# Patient Record
Sex: FEMALE | Race: WHITE | URBAN - NONMETROPOLITAN AREA
[De-identification: names, ages, dates, MRNs, and addresses within clinical notes are randomized per-mention and may not be internally consistent; named-entity substitution may affect disease eponyms.]

---

## 2018-10-31 LAB
AVERAGE GLUCOSE: NORMAL
CHOLESTEROL, TOTAL: 184 MG/DL
CHOLESTEROL/HDL RATIO: 3.5
GLUCOSE BLD-MCNC: 100 MG/DL
HBA1C MFR BLD: 5.6 %
HDLC SERPL-MCNC: 53 MG/DL (ref 35–70)
LDL CHOLESTEROL CALCULATED: 109 MG/DL (ref 0–160)
TRIGL SERPL-MCNC: 111 MG/DL
VLDLC SERPL CALC-MCNC: NORMAL MG/DL

## 2019-05-30 ENCOUNTER — OFFICE VISIT (OUTPATIENT)
Dept: FAMILY MEDICINE CLINIC | Age: 59
End: 2019-05-30
Payer: COMMERCIAL

## 2019-05-30 VITALS
DIASTOLIC BLOOD PRESSURE: 72 MMHG | SYSTOLIC BLOOD PRESSURE: 124 MMHG | WEIGHT: 134 LBS | TEMPERATURE: 97.7 F | OXYGEN SATURATION: 98 % | HEART RATE: 64 BPM | BODY MASS INDEX: 21.53 KG/M2 | HEIGHT: 66 IN
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
## 2019-05-30 PROCEDURE — 99213 OFFICE O/P EST LOW 20 MIN: CPT | Performed by: INTERNAL MEDICINE

## 2019-05-30 RX ORDER — DOXYCYCLINE HYCLATE 100 MG
100 TABLET ORAL 2 TIMES DAILY
Qty: 14 TABLET | Refills: 0 | Status: SHIPPED | OUTPATIENT
Start: 2019-05-30 | End: 2019-06-06

## 2019-05-30 ASSESSMENT — PATIENT HEALTH QUESTIONNAIRE - PHQ9
SUM OF ALL RESPONSES TO PHQ QUESTIONS 1-9: 0
SUM OF ALL RESPONSES TO PHQ9 QUESTIONS 1 & 2: 0
SUM OF ALL RESPONSES TO PHQ QUESTIONS 1-9: 0
2. FEELING DOWN, DEPRESSED OR HOPELESS: 0
1. LITTLE INTEREST OR PLEASURE IN DOING THINGS: 0

## 2019-05-30 NOTE — PROGRESS NOTES
2019   [facility]    Shahram García (:  1960) is a 61 y.o. female presents to express care after being bit by a tick on her left anterior arm. She's tried numerous home remedies including drawing salves which doesn't seem to help. Review of Systems    Prior to Visit Medications    Medication Sig Taking? Authorizing Provider   doxycycline hyclate (VIBRA-TABS) 100 MG tablet Take 1 tablet by mouth 2 times daily for 7 days Yes Kenda Lombard, DO        Social History     Tobacco Use    Smoking status: Never Smoker    Smokeless tobacco: Never Used   Substance Use Topics    Alcohol use: Not on file        Vitals:    19 1303   BP: 124/72   Pulse: 64   Temp: 97.7 °F (36.5 °C)   SpO2: 98%   Weight: 134 lb (60.8 kg)   Height: 5' 6\" (1.676 m)     Estimated body mass index is 21.63 kg/m² as calculated from the following:    Height as of this encounter: 5' 6\" (1.676 m). Weight as of this encounter: 134 lb (60.8 kg). Physical Exam   Exam of the skin on the left arm shows a 2.5 cm injected macular area with centralized scab wound. She remembers seeing the tick embedded in the arm before she tried to dig it out. There were a few small vesicles around the wound but this is likely from the high concentration/high osmolality drawing salve that she used. ASSESSMENT/PLAN:    ICD-10-CM    1. Tick bite, initial encounter W57. XXXA       She needs to use doxycycline 100 mg twice a day for 1 week. This hasn't also a cellulitis now involved. She agrees that she does not want to risk Lyme's disease. No follow-ups on file. An electronic signature was used to authenticate this note.     --Kenda Lombard, DO on 2019 at 1:20 PM

## 2019-08-19 ENCOUNTER — OFFICE VISIT (OUTPATIENT)
Dept: FAMILY MEDICINE CLINIC | Age: 59
End: 2019-08-19
Payer: COMMERCIAL

## 2019-08-19 VITALS
HEIGHT: 66 IN | TEMPERATURE: 98.3 F | BODY MASS INDEX: 22.2 KG/M2 | WEIGHT: 138.13 LBS | SYSTOLIC BLOOD PRESSURE: 112 MMHG | OXYGEN SATURATION: 99 % | HEART RATE: 88 BPM | DIASTOLIC BLOOD PRESSURE: 78 MMHG

## 2019-08-19 DIAGNOSIS — S66.911A SPRAIN AND STRAIN OF RIGHT WRIST: ICD-10-CM

## 2019-08-19 DIAGNOSIS — M79.641 RIGHT HAND PAIN: Primary | ICD-10-CM

## 2019-08-19 DIAGNOSIS — S63.501A SPRAIN AND STRAIN OF RIGHT WRIST: ICD-10-CM

## 2019-08-19 DIAGNOSIS — M25.531 RIGHT WRIST PAIN: ICD-10-CM

## 2019-08-19 PROCEDURE — 99213 OFFICE O/P EST LOW 20 MIN: CPT | Performed by: FAMILY MEDICINE

## 2019-08-19 ASSESSMENT — ENCOUNTER SYMPTOMS
ABDOMINAL PAIN: 0
DIARRHEA: 0
COUGH: 0
CHEST TIGHTNESS: 0
NAUSEA: 0
VOMITING: 0
SINUS PAIN: 0
SHORTNESS OF BREATH: 0
WHEEZING: 0
BACK PAIN: 0
EYE PAIN: 0
TROUBLE SWALLOWING: 0
CONSTIPATION: 0
SORE THROAT: 0

## 2019-10-17 LAB
ALBUMIN SERPL-MCNC: 4.9 G/DL
ALP BLD-CCNC: 72 U/L
ALT SERPL-CCNC: 24 U/L
ANION GAP SERPL CALCULATED.3IONS-SCNC: NORMAL MMOL/L
AST SERPL-CCNC: 21 U/L
BASOPHILS ABSOLUTE: 0 /ΜL
BASOPHILS RELATIVE PERCENT: 0 %
BILIRUB SERPL-MCNC: 0.4 MG/DL (ref 0.1–1.4)
BUN BLDV-MCNC: 9 MG/DL
CALCIUM SERPL-MCNC: 9.8 MG/DL
CHLORIDE BLD-SCNC: 102 MMOL/L
CHOLESTEROL, TOTAL: 168 MG/DL
CHOLESTEROL/HDL RATIO: 3.1
CO2: NORMAL
CREAT SERPL-MCNC: 0.92 MG/DL
EOSINOPHILS ABSOLUTE: 0.3 /ΜL
EOSINOPHILS RELATIVE PERCENT: 6 %
GFR CALCULATED: 68
GLUCOSE BLD-MCNC: NORMAL MG/DL
HCT VFR BLD CALC: 39.5 % (ref 36–46)
HDLC SERPL-MCNC: 54 MG/DL (ref 35–70)
HEMOGLOBIN: 13.3 G/DL (ref 12–16)
LDL CHOLESTEROL CALCULATED: 92 MG/DL (ref 0–160)
LYMPHOCYTES ABSOLUTE: 1.5 /ΜL
LYMPHOCYTES RELATIVE PERCENT: 34 %
MCH RBC QN AUTO: 30.4 PG
MCHC RBC AUTO-ENTMCNC: 33.7 G/DL
MCV RBC AUTO: 90 FL
MONOCYTES ABSOLUTE: 0.5 /ΜL
MONOCYTES RELATIVE PERCENT: 10 %
NEUTROPHILS ABSOLUTE: 2.2 /ΜL
NEUTROPHILS RELATIVE PERCENT: 50 %
PDW BLD-RTO: 12.8 %
PLATELET # BLD: 307 K/ΜL
PMV BLD AUTO: NORMAL FL
POTASSIUM SERPL-SCNC: 5.6 MMOL/L
RBC # BLD: 4.37 10^6/ΜL
SODIUM BLD-SCNC: 140 MMOL/L
T3 TOTAL: 27
T4 FREE: 8.1
TOTAL PROTEIN: 7.4
TRIGL SERPL-MCNC: 111 MG/DL
TSH SERPL DL<=0.05 MIU/L-ACNC: 2.59 UIU/ML
VLDLC SERPL CALC-MCNC: 22 MG/DL
WBC # BLD: 4.5 10^3/ML

## 2019-10-31 LAB
ALBUMIN SERPL-MCNC: 4.7 G/DL
ALP BLD-CCNC: 66 U/L
ALT SERPL-CCNC: 24 U/L
ANION GAP SERPL CALCULATED.3IONS-SCNC: NORMAL MMOL/L
AST SERPL-CCNC: 25 U/L
BASOPHILS ABSOLUTE: 0.1 /ΜL
BASOPHILS RELATIVE PERCENT: 1 %
BILIRUB SERPL-MCNC: 0.4 MG/DL (ref 0.1–1.4)
BUN BLDV-MCNC: NORMAL MG/DL
CALCIUM SERPL-MCNC: 9.5 MG/DL
CHLORIDE BLD-SCNC: NORMAL MMOL/L
CHOLESTEROL, TOTAL: 184 MG/DL
CHOLESTEROL/HDL RATIO: 3.5
CO2: NORMAL
CREAT SERPL-MCNC: 0.96 MG/DL
EOSINOPHILS ABSOLUTE: 0.2 /ΜL
EOSINOPHILS RELATIVE PERCENT: 6 %
GFR CALCULATED: 65
GLUCOSE BLD-MCNC: 100 MG/DL
HCT VFR BLD CALC: 39.7 % (ref 36–46)
HDLC SERPL-MCNC: 53 MG/DL (ref 35–70)
HEMOGLOBIN: 13.2 G/DL (ref 12–16)
LDL CHOLESTEROL CALCULATED: 109 MG/DL (ref 0–160)
LYMPHOCYTES ABSOLUTE: 1.3 /ΜL
LYMPHOCYTES RELATIVE PERCENT: 36 %
MCH RBC QN AUTO: 30.3 PG
MCHC RBC AUTO-ENTMCNC: 33.2 G/DL
MCV RBC AUTO: 91 FL
MONOCYTES ABSOLUTE: 0.4 /ΜL
MONOCYTES RELATIVE PERCENT: 11 %
NEUTROPHILS ABSOLUTE: 1.7 /ΜL
NEUTROPHILS RELATIVE PERCENT: 46 %
PDW BLD-RTO: 13 %
PLATELET # BLD: 289 K/ΜL
PMV BLD AUTO: NORMAL FL
POTASSIUM SERPL-SCNC: 4.9 MMOL/L
RBC # BLD: 4.35 10^6/ΜL
SODIUM BLD-SCNC: NORMAL MMOL/L
T3 TOTAL: 25
T4 FREE: 7.7
TOTAL PROTEIN: 7
TRIGL SERPL-MCNC: 111 MG/DL
TSH SERPL DL<=0.05 MIU/L-ACNC: 3.84 UIU/ML
VLDLC SERPL CALC-MCNC: 22 MG/DL
WBC # BLD: 3.7 10^3/ML

## 2019-11-19 ENCOUNTER — TELEPHONE (OUTPATIENT)
Dept: FAMILY MEDICINE CLINIC | Age: 59
End: 2019-11-19

## 2019-12-03 ENCOUNTER — OFFICE VISIT (OUTPATIENT)
Dept: FAMILY MEDICINE CLINIC | Age: 59
End: 2019-12-03
Payer: COMMERCIAL

## 2019-12-03 VITALS
HEIGHT: 67 IN | SYSTOLIC BLOOD PRESSURE: 126 MMHG | WEIGHT: 138 LBS | BODY MASS INDEX: 21.66 KG/M2 | HEART RATE: 74 BPM | DIASTOLIC BLOOD PRESSURE: 74 MMHG | OXYGEN SATURATION: 96 %

## 2019-12-03 DIAGNOSIS — M19.90 INFLAMMATORY ARTHRITIS: Primary | ICD-10-CM

## 2019-12-03 PROCEDURE — 90471 IMMUNIZATION ADMIN: CPT | Performed by: INTERNAL MEDICINE

## 2019-12-03 PROCEDURE — 99214 OFFICE O/P EST MOD 30 MIN: CPT | Performed by: INTERNAL MEDICINE

## 2019-12-03 PROCEDURE — 90686 IIV4 VACC NO PRSV 0.5 ML IM: CPT | Performed by: INTERNAL MEDICINE

## 2019-12-04 SDOH — HEALTH STABILITY: MENTAL HEALTH
STRESS IS WHEN SOMEONE FEELS TENSE, NERVOUS, ANXIOUS, OR CAN'T SLEEP AT NIGHT BECAUSE THEIR MIND IS TROUBLED. HOW STRESSED ARE YOU?: TO SOME EXTENT

## 2019-12-04 SDOH — HEALTH STABILITY: MENTAL HEALTH: HOW MANY STANDARD DRINKS CONTAINING ALCOHOL DO YOU HAVE ON A TYPICAL DAY?: 1 OR 2

## 2019-12-04 SDOH — HEALTH STABILITY: PHYSICAL HEALTH: ON AVERAGE, HOW MANY MINUTES DO YOU ENGAGE IN EXERCISE AT THIS LEVEL?: 0 MIN

## 2019-12-04 SDOH — HEALTH STABILITY: MENTAL HEALTH: HOW OFTEN DO YOU HAVE A DRINK CONTAINING ALCOHOL?: 2-4 TIMES A MONTH

## 2019-12-04 SDOH — HEALTH STABILITY: PHYSICAL HEALTH: ON AVERAGE, HOW MANY DAYS PER WEEK DO YOU ENGAGE IN MODERATE TO STRENUOUS EXERCISE (LIKE A BRISK WALK)?: 0 DAYS

## 2019-12-04 ASSESSMENT — ENCOUNTER SYMPTOMS
RESPIRATORY NEGATIVE: 1
EYES NEGATIVE: 1
GASTROINTESTINAL NEGATIVE: 1
ALLERGIC/IMMUNOLOGIC NEGATIVE: 1

## 2019-12-05 LAB — SEDIMENTATION RATE, ERYTHROCYTE: 9

## 2019-12-09 ENCOUNTER — TELEPHONE (OUTPATIENT)
Dept: FAMILY MEDICINE CLINIC | Age: 59
End: 2019-12-09

## 2020-01-06 ENCOUNTER — TELEPHONE (OUTPATIENT)
Dept: FAMILY MEDICINE CLINIC | Age: 60
End: 2020-01-06

## 2020-01-06 NOTE — TELEPHONE ENCOUNTER
I have not heard anything from Dr. Lisa Gauthier. Please get the most recent documentation from that office. I believe it is Zuleika Tena that she is referring to.

## 2020-01-28 ENCOUNTER — HOSPITAL ENCOUNTER (OUTPATIENT)
Age: 60
Discharge: HOME | End: 2020-01-30
Payer: COMMERCIAL

## 2020-01-28 ENCOUNTER — OFFICE VISIT (OUTPATIENT)
Dept: FAMILY MEDICINE CLINIC | Age: 60
End: 2020-01-28
Payer: COMMERCIAL

## 2020-01-28 VITALS — SYSTOLIC BLOOD PRESSURE: 132 MMHG | DIASTOLIC BLOOD PRESSURE: 80 MMHG | OXYGEN SATURATION: 99 % | HEART RATE: 62 BPM

## 2020-01-28 PROBLEM — R30.0 DYSURIA: Status: ACTIVE | Noted: 2020-01-28

## 2020-01-28 PROBLEM — K21.9 GASTROESOPHAGEAL REFLUX DISEASE WITHOUT ESOPHAGITIS: Status: ACTIVE | Noted: 2020-01-28

## 2020-01-28 PROBLEM — M19.90 INFLAMMATORY ARTHRITIS: Status: ACTIVE | Noted: 2020-01-28

## 2020-01-28 LAB
BILIRUBIN, POC: NORMAL
BLOOD URINE, POC: NORMAL
CLARITY, POC: CLEAR
COLOR, POC: YELLOW
GLUCOSE URINE, POC: NORMAL
KETONES, POC: NORMAL
LEUKOCYTE EST, POC: NORMAL
NITRITE, POC: NORMAL
PH, POC: 6.5
PROTEIN, POC: NORMAL
SPECIFIC GRAVITY, POC: 1.02
UROBILINOGEN, POC: 0.2

## 2020-01-28 PROCEDURE — 99213 OFFICE O/P EST LOW 20 MIN: CPT | Performed by: INTERNAL MEDICINE

## 2020-01-28 PROCEDURE — 87088 URINE BACTERIA CULTURE: CPT

## 2020-01-28 PROCEDURE — 81002 URINALYSIS NONAUTO W/O SCOPE: CPT | Performed by: INTERNAL MEDICINE

## 2020-01-28 RX ORDER — OMEPRAZOLE 20 MG/1
20 CAPSULE, DELAYED RELEASE ORAL
Qty: 90 CAPSULE | Refills: 1 | Status: SHIPPED | OUTPATIENT
Start: 2020-01-28

## 2020-01-28 ASSESSMENT — ENCOUNTER SYMPTOMS
ALLERGIC/IMMUNOLOGIC NEGATIVE: 1
EYES NEGATIVE: 1
RESPIRATORY NEGATIVE: 1

## 2020-01-28 ASSESSMENT — PATIENT HEALTH QUESTIONNAIRE - PHQ9
SUM OF ALL RESPONSES TO PHQ QUESTIONS 1-9: 0
1. LITTLE INTEREST OR PLEASURE IN DOING THINGS: 0
SUM OF ALL RESPONSES TO PHQ QUESTIONS 1-9: 0
SUM OF ALL RESPONSES TO PHQ9 QUESTIONS 1 & 2: 0
2. FEELING DOWN, DEPRESSED OR HOPELESS: 0

## 2020-01-29 ENCOUNTER — TELEPHONE (OUTPATIENT)
Dept: FAMILY MEDICINE CLINIC | Age: 60
End: 2020-01-29

## 2020-01-30 NOTE — TELEPHONE ENCOUNTER
Armando Smith calling she got the faxed letter, however we put that she is having the ct scan. Her  apryl is.  I will update letter and fax again

## 2020-01-31 LAB — URINE CULTURE, ROUTINE: NORMAL

## 2020-03-03 ENCOUNTER — TELEPHONE (OUTPATIENT)
Dept: FAMILY MEDICINE CLINIC | Age: 60
End: 2020-03-03

## 2020-03-03 NOTE — TELEPHONE ENCOUNTER
I just glance that this report. Please have it scanned in and then I will make further comment. These are often over called by radiology.

## 2020-03-03 NOTE — TELEPHONE ENCOUNTER
Patient informed. She states 's office told her the x-ray showed she had hyperinflammation in her lungs. Patient states she is now confused.    Please advise, thank you

## 2020-03-06 ENCOUNTER — TELEPHONE (OUTPATIENT)
Dept: FAMILY MEDICINE CLINIC | Age: 60
End: 2020-03-06

## 2020-03-13 LAB
EXPIRATORY TIME: NORMAL
FEF 25-75% %PRED-PRE: NORMAL
FEF 25-75% PRED: NORMAL
FEF 25-75%-PRE: NORMAL
FEV1 %PRED-PRE: 117 %
FEV1 PRED: NORMAL
FEV1/FVC %PRED-PRE: NORMAL
FEV1/FVC PRED: NORMAL
FEV1/FVC: 103 %
FEV1: NORMAL
FVC %PRED-PRE: NORMAL
FVC PRED: NORMAL
FVC: NORMAL
PEF %PRED-PRE: NORMAL
PEF PRED: NORMAL
PEF-PRE: NORMAL

## 2020-03-13 ASSESSMENT — PULMONARY FUNCTION TESTS
FEV1_PERCENT_PREDICTED_PRE: 117
FEV1/FVC: 103

## 2020-03-16 ENCOUNTER — TELEPHONE (OUTPATIENT)
Dept: FAMILY MEDICINE CLINIC | Age: 60
End: 2020-03-16

## 2020-03-16 NOTE — TELEPHONE ENCOUNTER
Canceled the March 18 appointment. When I get the pulmonary function tests from [facility], I will call her.

## 2020-03-16 NOTE — TELEPHONE ENCOUNTER
Master Ureña is calling for her PFT results from 03/13? She is wanting them over the phone and to see if dr will cx her 03/18 visit. We do not have report. I called [facility] and and it is not yet read. They will keep checking and send over once finalized. Can she cx 03/18 appt and come as scheduled in June ?

## 2021-04-12 ENCOUNTER — OFFICE VISIT (OUTPATIENT)
Dept: FAMILY MEDICINE CLINIC | Age: 61
End: 2021-04-12
Payer: COMMERCIAL

## 2021-04-12 VITALS — OXYGEN SATURATION: 98 % | WEIGHT: 143.6 LBS | BODY MASS INDEX: 22.83 KG/M2 | HEART RATE: 77 BPM | TEMPERATURE: 96.9 F

## 2021-04-12 DIAGNOSIS — R30.0 DYSURIA: ICD-10-CM

## 2021-04-12 DIAGNOSIS — R30.0 DYSURIA: Primary | ICD-10-CM

## 2021-04-12 LAB
BILIRUBIN, POC: ABNORMAL
BLOOD URINE, POC: NEGATIVE
CLARITY, POC: CLEAR
COLOR, POC: ABNORMAL
GLUCOSE URINE, POC: 100
KETONES, POC: NEGATIVE
LEUKOCYTE EST, POC: ABNORMAL
NITRITE, POC: POSITIVE
PH, POC: 6
PROTEIN, POC: 100
SPECIFIC GRAVITY, POC: >=1.03
UROBILINOGEN, POC: 4

## 2021-04-12 PROCEDURE — 81002 URINALYSIS NONAUTO W/O SCOPE: CPT | Performed by: FAMILY MEDICINE

## 2021-04-12 PROCEDURE — 99213 OFFICE O/P EST LOW 20 MIN: CPT | Performed by: FAMILY MEDICINE

## 2021-04-12 RX ORDER — SULFAMETHOXAZOLE AND TRIMETHOPRIM 800; 160 MG/1; MG/1
1 TABLET ORAL 2 TIMES DAILY
Qty: 14 TABLET | Refills: 0 | Status: SHIPPED | OUTPATIENT
Start: 2021-04-12 | End: 2021-04-19

## 2021-04-12 NOTE — PROGRESS NOTES
OFFICE NOTE    21  Name: Chelle Stroud  NW   Sex:female   Age:61 y.o. SUBJECTIVE  Chief Complaint   Patient presents with    Urinary Tract Infection     Onset Saturday       HPI Got Covid shot on Friday. Fever and vomiting on Saturday. Then burning with urination, frequency,urgency. Review of Systems   No fever or vomiting yesterday or today, no gross hematuria        Current Outpatient Medications:     omeprazole (PRILOSEC) 20 MG delayed release capsule, Take 1 capsule by mouth every morning (before breakfast), Disp: 90 capsule, Rfl: 1  No Known Allergies    Past Medical History:   Diagnosis Date    Cervical cancer ([facility] [address] 2018    [facility] Inflammatory arthritis     [facility]- [facility]     Past Surgical History:   Procedure Laterality Date    BREAST CYST EXCISION Left 2009    CHOLECYSTECTOMY, LAPAROSCOPIC N/A     [place]    SALIVARY GLAND SURGERY      [address]     Family History   Problem Relation Age of Onset    Other Mother         scleroderma    Other Father         valvular heart disease-endocarditis    Coronary Art Dis Brother         MI age 43- valve replacement-ICD     Social History     Tobacco History     Smoking Status  Never Smoker    Smokeless Tobacco Use  Never Used          Alcohol History     Alcohol Use Status  Yes Drinks/Week  1 Glasses of wine per week Amount  1.0 standard drinks of alcohol/wk          Drug Use     Drug Use Status  Never          Sexual Activity     Sexually Active  Not Asked                OBJECTIVE  Vitals:    21 1645   Pulse: 77   Temp: 96.9 °F (36.1 °C)   SpO2: 98%   Weight: 143 lb 9.6 oz (65.1 kg)        Body mass index is 22.83 kg/m². Orders Placed This Encounter   Procedures    Culture, Urine     Standing Status:   Future     Standing Expiration Date:   2022     Order Specific Question:   Specify (ex-cath, midstream, cysto, etc)?      Answer:   midstream    POCT Urinalysis no Micro        EXAM   Physical

## 2021-04-15 LAB
ORGANISM: ABNORMAL
URINE CULTURE, ROUTINE: ABNORMAL

## 2021-10-26 ENCOUNTER — OFFICE VISIT (OUTPATIENT)
Dept: FAMILY MEDICINE CLINIC | Age: 61
End: 2021-10-26
Payer: COMMERCIAL

## 2021-10-26 VITALS
TEMPERATURE: 97.6 F | HEART RATE: 77 BPM | BODY MASS INDEX: 22.95 KG/M2 | WEIGHT: 146.2 LBS | RESPIRATION RATE: 18 BRPM | DIASTOLIC BLOOD PRESSURE: 78 MMHG | HEIGHT: 67 IN | OXYGEN SATURATION: 98 % | SYSTOLIC BLOOD PRESSURE: 124 MMHG

## 2021-10-26 DIAGNOSIS — J01.00 ACUTE NON-RECURRENT MAXILLARY SINUSITIS: Primary | ICD-10-CM

## 2021-10-26 PROCEDURE — 99213 OFFICE O/P EST LOW 20 MIN: CPT | Performed by: NURSE PRACTITIONER

## 2021-10-26 RX ORDER — CETIRIZINE HYDROCHLORIDE 10 MG/1
10 TABLET ORAL DAILY
Qty: 30 TABLET | Refills: 0 | Status: SHIPPED | OUTPATIENT
Start: 2021-10-26

## 2021-10-26 RX ORDER — AMOXICILLIN 875 MG/1
875 TABLET, COATED ORAL 2 TIMES DAILY
Qty: 20 TABLET | Refills: 0 | Status: SHIPPED | OUTPATIENT
Start: 2021-10-26 | End: 2021-11-05

## 2021-10-26 RX ORDER — FLUTICASONE PROPIONATE 50 MCG
SPRAY, SUSPENSION (ML) NASAL
Qty: 16 G | Refills: 0 | Status: SHIPPED | OUTPATIENT
Start: 2021-10-26

## 2021-10-26 NOTE — PATIENT INSTRUCTIONS
Patient Education        Sinusitis: Care Instructions  Your Care Instructions     Sinusitis is an infection of the lining of the sinus cavities in your head. Sinusitis often follows a cold. It causes pain and pressure in your head and face. In most cases, sinusitis gets better on its own in 1 to 2 weeks. But some mild symptoms may last for several weeks. Sometimes antibiotics are needed. Follow-up care is a key part of your treatment and safety. Be sure to make and go to all appointments, and call your doctor if you are having problems. It's also a good idea to know your test results and keep a list of the medicines you take. How can you care for yourself at home? · Take an over-the-counter pain medicine, such as acetaminophen (Tylenol), ibuprofen (Advil, Motrin), or naproxen (Aleve). Read and follow all instructions on the label. · If the doctor prescribed antibiotics, take them as directed. Do not stop taking them just because you feel better. You need to take the full course of antibiotics. · Be careful when taking over-the-counter cold or flu medicines and Tylenol at the same time. Many of these medicines have acetaminophen, which is Tylenol. Read the labels to make sure that you are not taking more than the recommended dose. Too much acetaminophen (Tylenol) can be harmful. · Breathe warm, moist air from a steamy shower, a hot bath, or a sink filled with hot water. Avoid cold, dry air. Using a humidifier in your home may help. Follow the directions for cleaning the machine. · Use saline (saltwater) nasal washes. This can help keep your nasal passages open and wash out mucus and bacteria. You can buy saline nose drops at a grocery store or drugstore. Or you can make your own at home by adding 1 teaspoon of salt and 1 teaspoon of baking soda to 2 cups of distilled water. If you make your own, fill a bulb syringe with the solution, insert the tip into your nostril, and squeeze gently.  Misha Flower your nose.  · Put a hot, wet towel or a warm gel pack on your face 3 or 4 times a day for 5 to 10 minutes each time. · Try a decongestant nasal spray like oxymetazoline (Afrin). Do not use it for more than 3 days in a row. Using it for more than 3 days can make your congestion worse. When should you call for help? Call your doctor now or seek immediate medical care if:    · You have new or worse swelling or redness in your face or around your eyes.     · You have a new or higher fever. Watch closely for changes in your health, and be sure to contact your doctor if:    · You have new or worse facial pain.     · The mucus from your nose becomes thicker (like pus) or has new blood in it.     · You are not getting better as expected. Where can you learn more? Go to https://[employer]. org and sign in to your [email or web address] account. Enter D235 in the Plexxi box to learn more about \"Sinusitis: Care Instructions. \"     If you do not have an account, please click on the \"Sign Up Now\" link. Current as of: December 2, 2020               Content Version: 13.0  © 6400-0193 Healthwise, [facility]. Care instructions adapted under license by [facility] ([facility]). If you have questions about a medical condition or this instruction, always ask your healthcare professional. [address] any warranty or liability for your use of this information.

## 2021-10-26 NOTE — PROGRESS NOTES
10/26/21  Raul Bo : 1960 Sex: female  Age 64 y.o. Subjective:  Chief Complaint   Patient presents with    Drainage     patient states that she has so much drainage draining down the back of her throat her throat is swollen. Mack Pineda Laryngitis    Sinusitis     patient states that she has ear pressure, eye pressure, head pressure very congested        HPI:   Raul Bo , 64 y.o. female presents to the clinic for evaluation of sinus congestion x 1 day. The patient also reports throat irritation and sinus pressure. The patient has taken Allegra for symptoms. The patient reports unchanged symptoms over time. The patient denies ill exposure. The patient denies hx of COVID-19 and reports having the vaccines. The patient denies acute loss of taste and smell, headache, cough, sore throat, rash, and fever. The patient also denies chest pain, abdominal pain, shortness of breath, and nausea / vomiting / diarrhea. ROS:   Unless otherwise stated in this report the patient's positive and negative responses for review of systems for constitutional, eyes, ENT, cardiovascular, respiratory, gastrointestinal, neurological, , musculoskeletal, and integument systems and related systems to the presenting problem are either stated in the history of present illness or were not pertinent or were negative for the symptoms and/or complaints related to the presenting medical problem. Positives and pertinent negatives as per HPI. All others reviewed and are negative.       PMH:     Past Medical History:   Diagnosis Date    Cervical cancer ([facility] [address] 2018    [facility]    Inflammatory arthritis 2008    [facility]umat[facility]       Past Surgical History:   Procedure Laterality Date    BREAST CYST EXCISION Left 2009    CHOLECYSTECTOMY, LAPAROSCOPIC N/A 2013    [place]    [facility]       Family History   Problem Relation Age of Onset    Other Mother         scleroderma    Other Father and regular rhythm. Pulses: Normal pulses. Heart sounds: Normal heart sounds. Pulmonary:      Effort: Pulmonary effort is normal.      Breath sounds: Normal breath sounds. Abdominal:      General: Bowel sounds are normal.      Palpations: Abdomen is soft. Musculoskeletal:         General: Normal range of motion. Cervical back: Normal range of motion and neck supple. Lymphadenopathy:      Cervical: No cervical adenopathy. Skin:     General: Skin is warm and dry. Capillary Refill: Capillary refill takes less than 2 seconds. Neurological:      General: No focal deficit present. Mental Status: She is alert and oriented to person, place, and time. Psychiatric:         Mood and Affect: Mood normal.         Behavior: Behavior normal.          Testing:   (All laboratory and radiology results have been personally reviewed by myself)  Labs:  No results found for this visit on 10/26/21. Imaging: All Radiology results interpreted by Radiologist unless otherwise noted. No orders to display       Assessment / Plan:   The patient's vitals, allergies, medications, and past medical history have been reviewed. Yuliana LARES was seen today for drainage, laryngitis and sinusitis. Diagnoses and all orders for this visit:    Acute non-recurrent maxillary sinusitis  -     amoxicillin (AMOXIL) 875 MG tablet; Take 1 tablet by mouth 2 times daily for 10 days  -     fluticasone (FLONASE) 50 MCG/ACT nasal spray; 1-2 sprays, each nostril daily as needed for nasal congestion. -     cetirizine (ZYRTEC ALLERGY) 10 MG tablet; Take 1 tablet by mouth daily        - Disposition: Home    - Educational material printed for patient's review and were included in patient instructions. After Visit Summary and given to patient at the end of visit. - COVID-19 test declined. Encouraged oral fluids and rest. Discussed symptomatic treatments with patient today including Tylenol prn for fever / pain.  Schedule a follow-up with PCP in 2-3 days. Red flag symptoms were discussed with the patient today. The patient is directed to go the ED if symptoms change or worsen. Pt verbalizes understanding and is in agreement with plan of care. All questions answered. SIGNATURE: DRE Singh-HAYLEE    *NOTE: This report was transcribed using voice recognition software. Every effort was made to ensure accuracy; however, inadvertent computerized transcription errors may be present.

## 2021-11-30 ENCOUNTER — TELEPHONE (OUTPATIENT)
Dept: FAMILY MEDICINE CLINIC | Age: 61
End: 2021-11-30

## 2021-11-30 NOTE — TELEPHONE ENCOUNTER
Patient called and stated she tested positive for Covid.  Is there anything she can do other than just fluids and rest?

## 2021-12-01 NOTE — TELEPHONE ENCOUNTER
I need the slip for monoclonal antibodies. Please get this to me and we will get it faxed to [facility].

## 2021-12-01 NOTE — TELEPHONE ENCOUNTER
Patient notified. She said she would like to get this done through [facility].

## 2021-12-01 NOTE — TELEPHONE ENCOUNTER
Covid monoclonal antibodies are available through [facility] or [facility] \[facility]\"" - [facility] BAUMANN.  Please let me know if she would like to pursue this. We can place the order through the pharmacies.

## 2021-12-17 ENCOUNTER — TELEPHONE (OUTPATIENT)
Dept: FAMILY MEDICINE CLINIC | Age: 61
End: 2021-12-17

## 2021-12-17 RX ORDER — SULFAMETHOXAZOLE AND TRIMETHOPRIM 800; 160 MG/1; MG/1
1 TABLET ORAL 2 TIMES DAILY
Qty: 10 TABLET | Refills: 0 | Status: SHIPPED | OUTPATIENT
Start: 2021-12-17 | End: 2021-12-22

## 2021-12-17 NOTE — TELEPHONE ENCOUNTER
Patient called and stated she has burning and pressure along with having to urinate constantly. She said she knows its a UTI.  She is at work and was wanting to know if something could be called in?

## 2021-12-29 ENCOUNTER — OFFICE VISIT (OUTPATIENT)
Dept: FAMILY MEDICINE CLINIC | Age: 61
End: 2021-12-29
Payer: COMMERCIAL

## 2021-12-29 VITALS
DIASTOLIC BLOOD PRESSURE: 88 MMHG | TEMPERATURE: 97.8 F | OXYGEN SATURATION: 98 % | WEIGHT: 143 LBS | HEART RATE: 80 BPM | BODY MASS INDEX: 22.4 KG/M2 | SYSTOLIC BLOOD PRESSURE: 134 MMHG

## 2021-12-29 DIAGNOSIS — R05.3 POST-COVID CHRONIC COUGH: Primary | ICD-10-CM

## 2021-12-29 DIAGNOSIS — U09.9 POST-COVID CHRONIC COUGH: Primary | ICD-10-CM

## 2021-12-29 PROCEDURE — 99213 OFFICE O/P EST LOW 20 MIN: CPT | Performed by: FAMILY MEDICINE

## 2021-12-29 RX ORDER — AZITHROMYCIN 250 MG/1
250 TABLET, FILM COATED ORAL SEE ADMIN INSTRUCTIONS
Qty: 6 TABLET | Refills: 0 | Status: SHIPPED | OUTPATIENT
Start: 2021-12-29 | End: 2022-01-03

## 2021-12-29 RX ORDER — GUAIFENESIN AND CODEINE PHOSPHATE 100; 10 MG/5ML; MG/5ML
5 SOLUTION ORAL EVERY 4 HOURS PRN
Qty: 237 ML | Refills: 0 | Status: SHIPPED | OUTPATIENT
Start: 2021-12-29 | End: 2022-01-06

## 2021-12-29 RX ORDER — ALBUTEROL SULFATE 90 UG/1
2 AEROSOL, METERED RESPIRATORY (INHALATION) 4 TIMES DAILY PRN
Qty: 54 G | Refills: 1 | Status: SHIPPED | OUTPATIENT
Start: 2021-12-29

## 2021-12-29 RX ORDER — METHYLPREDNISOLONE 4 MG/1
TABLET ORAL
Qty: 1 KIT | Refills: 0 | Status: SHIPPED | OUTPATIENT
Start: 2021-12-29

## 2021-12-29 NOTE — PROGRESS NOTES
Du Hapmton (:  1960) is a 64 y.o. female,Established patient, here for evaluation of the following chief complaint(s):  Post-COVID Symptoms (had covid after thanksgiving, lingering cough)         ASSESSMENT/PLAN:  1. Post-COVID chronic cough  -     albuterol sulfate HFA (VENTOLIN HFA) 108 (90 Base) MCG/ACT inhaler; Inhale 2 puffs into the lungs 4 times daily as needed for Wheezing, Disp-54 g, R-1Normal  -     methylPREDNISolone (MEDROL DOSEPACK) 4 MG tablet; Take by mouth., Disp-1 kit, R-0Normal  -     azithromycin (ZITHROMAX) 250 MG tablet; Take 1 tablet by mouth See Admin Instructions for 5 days 500mg on day 1 followed by 250mg on days 2 - 5, Disp-6 tablet, R-0Normal  -     guaiFENesin-codeine (CHERATUSSIN AC) 100-10 MG/5ML syrup; Take 5 mLs by mouth every 4 hours as needed for Cough for up to 8 days. , Disp-237 mL, R-0Normal  Treat symptomatically for post Covid symptoms. Patient will follow up with PCP in the next 1 to 2 weeks or sooner for further evaluation. Patient voiced understanding. No follow-ups on file. Subjective   SUBJECTIVE/OBJECTIVE:  HPI   Presents today for evaluation of post Covid symptoms. Patient states that she had Covid immediately after Thanksgiving but still has a lingering cough. Denies any chest pain or shortness of breath. Denies any fever or chills. Denies any nausea vomiting or diarrhea. Denies any new symptoms other than the cough. Review of Systems   Respiratory: Positive for cough. All other systems reviewed and are negative.          Current Outpatient Medications:     albuterol sulfate HFA (VENTOLIN HFA) 108 (90 Base) MCG/ACT inhaler, Inhale 2 puffs into the lungs 4 times daily as needed for Wheezing, Disp: 54 g, Rfl: 1    methylPREDNISolone (MEDROL DOSEPACK) 4 MG tablet, Take by mouth., Disp: 1 kit, Rfl: 0    azithromycin (ZITHROMAX) 250 MG tablet, Take 1 tablet by mouth See Admin Instructions for 5 days 500mg on day 1 followed by 250mg Resource Strain:     Difficulty of Paying Living Expenses: Not on file   Food Insecurity:     Worried About Running Out of Food in the Last Year: Not on file    Fernando of Food in the Last Year: Not on file   Transportation Needs:     Lack of Transportation (Medical): Not on file    Lack of Transportation (Non-Medical): Not on file   Physical Activity:     Days of Exercise per Week: Not on file    Minutes of Exercise per Session: Not on file   Stress:     Feeling of Stress : Not on file   Social Connections:     Frequency of Communication with Friends and Family: Not on file    Frequency of Social Gatherings with Friends and Family: Not on file    Attends Jewish Services: Not on file    Active Member of [address] [place] [address] [employer] or Organizations: Not on file    Attends Club or Organization Meetings: Not on file    Marital Status: Not on file   Intimate Partner Violence:     Fear of Current or Ex-Partner: Not on file    Emotionally Abused: Not on file    Physically Abused: Not on file    Sexually Abused: Not on file   Housing Stability:     Unable to Pay for Housing in the Last Year: Not on file    Number of [place] in the Last Year: Not on file    Unstable Housing in the Last Year: Not on file     Family History   Problem Relation Age of Onset    Other Mother         scleroderma    Other Father         valvular heart disease-endocarditis    Coronary Art Dis Brother         MI age 43- valve replacement-ICD      Health Maintenance Due   Topic Date Due    Colon cancer screen colonoscopy  Never done     There are no preventive care reminders to display for this patient. There are no preventive care reminders to display for this patient.    Health Maintenance Due   Topic    DTaP/Tdap/Td vaccine (1 - Tdap)    Shingles Vaccine (1 of 2)      Health Maintenance   Topic Date Due    Hepatitis C screen  Never done    COVID-19 Vaccine (1) Never done    HIV screen  Never done    DTaP/Tdap/Td vaccine (1 - Tdap) Never done    Cervical cancer screen  Never done    Colon cancer screen colonoscopy  Never done    Shingles Vaccine (1 of 2) Never done    Breast cancer screen  12/23/2022    Lipid screen  10/31/2024    Flu vaccine  Completed    Hepatitis A vaccine  Aged Out    Hepatitis B vaccine  Aged Out    Hib vaccine  Aged Out    Meningococcal (ACWY) vaccine  Aged Out    Pneumococcal 0-64 years Vaccine  Aged Out      There are no preventive care reminders to display for this patient. There are no preventive care reminders to display for this patient. /88   Pulse 80   Temp 97.8 °F (36.6 °C)   Wt 143 lb (64.9 kg)   SpO2 98%   BMI 22.40 kg/m²     Objective   Physical Exam  Vitals reviewed. Constitutional:       Appearance: She is well-developed. She is not ill-appearing. HENT:      Head: Normocephalic and atraumatic. Right Ear: External ear normal.      Left Ear: External ear normal.      Nose: Nose normal.      Mouth/Throat:      Pharynx: No posterior oropharyngeal erythema. Eyes:      Conjunctiva/sclera: Conjunctivae normal.      Pupils: Pupils are equal, round, and reactive to light. Cardiovascular:      Rate and Rhythm: Normal rate and regular rhythm. Heart sounds: Normal heart sounds. Pulmonary:      Effort: Pulmonary effort is normal.      Breath sounds: Decreased breath sounds and wheezing present. Abdominal:      General: Bowel sounds are normal.      Palpations: Abdomen is soft. Musculoskeletal:         General: Normal range of motion. Cervical back: Normal range of motion and neck supple. Skin:     General: Skin is warm and dry. Findings: No erythema. Neurological:      Mental Status: She is alert and oriented to person, place, and time. Cranial Nerves: No cranial nerve deficit. Psychiatric:         Behavior: Behavior normal.         Judgment: Judgment normal.                  An electronic signature was used to authenticate this note.     --Love Grlaura Deist, DO

## 2022-01-04 ASSESSMENT — ENCOUNTER SYMPTOMS: COUGH: 1

## 2022-07-19 ENCOUNTER — OFFICE VISIT (OUTPATIENT)
Dept: FAMILY MEDICINE CLINIC | Age: 62
End: 2022-07-19
Payer: COMMERCIAL

## 2022-07-19 VITALS
HEART RATE: 64 BPM | TEMPERATURE: 97.5 F | BODY MASS INDEX: 21.94 KG/M2 | HEIGHT: 67 IN | SYSTOLIC BLOOD PRESSURE: 126 MMHG | OXYGEN SATURATION: 98 % | RESPIRATION RATE: 18 BRPM | WEIGHT: 139.8 LBS | DIASTOLIC BLOOD PRESSURE: 82 MMHG

## 2022-07-19 DIAGNOSIS — N39.0 URINARY TRACT INFECTION WITHOUT HEMATURIA, SITE UNSPECIFIED: Primary | ICD-10-CM

## 2022-07-19 DIAGNOSIS — N39.0 URINARY TRACT INFECTION WITHOUT HEMATURIA, SITE UNSPECIFIED: ICD-10-CM

## 2022-07-19 DIAGNOSIS — R35.0 URINARY FREQUENCY: ICD-10-CM

## 2022-07-19 LAB
BILIRUBIN, POC: NEGATIVE
BLOOD URINE, POC: NEGATIVE
CLARITY, POC: CLEAR
COLOR, POC: YELLOW
GLUCOSE URINE, POC: NEGATIVE
KETONES, POC: NEGATIVE
LEUKOCYTE EST, POC: NEGATIVE
NITRITE, POC: NEGATIVE
PH, POC: 7
PROTEIN, POC: NEGATIVE
SPECIFIC GRAVITY, POC: 1.01
UROBILINOGEN, POC: 0.2

## 2022-07-19 PROCEDURE — 99213 OFFICE O/P EST LOW 20 MIN: CPT | Performed by: NURSE PRACTITIONER

## 2022-07-19 PROCEDURE — 81002 URINALYSIS NONAUTO W/O SCOPE: CPT | Performed by: NURSE PRACTITIONER

## 2022-07-19 RX ORDER — SULFAMETHOXAZOLE AND TRIMETHOPRIM 800; 160 MG/1; MG/1
1 TABLET ORAL 2 TIMES DAILY
Qty: 14 TABLET | Refills: 0 | Status: SHIPPED | OUTPATIENT
Start: 2022-07-19 | End: 2022-07-26

## 2022-07-19 ASSESSMENT — PATIENT HEALTH QUESTIONNAIRE - PHQ9
SUM OF ALL RESPONSES TO PHQ QUESTIONS 1-9: 0
SUM OF ALL RESPONSES TO PHQ QUESTIONS 1-9: 0
1. LITTLE INTEREST OR PLEASURE IN DOING THINGS: 0
SUM OF ALL RESPONSES TO PHQ QUESTIONS 1-9: 0
2. FEELING DOWN, DEPRESSED OR HOPELESS: 0
SUM OF ALL RESPONSES TO PHQ QUESTIONS 1-9: 0
SUM OF ALL RESPONSES TO PHQ9 QUESTIONS 1 & 2: 0

## 2022-07-19 NOTE — PROGRESS NOTES
Chief Complaint:   Urinary Frequency (Patient states that symptoms began Friday evening. . she was up all night having to use the restroom. Trula Blew stomach cramps had also began Friday evening. . states that she feels pressure , when she uses the restroom she does have some burning. . patient states that she has a history of kidney infection )    History of Present Illness   Source of history provided by:  patient. Marlyn Goncalves is a 58 y.o. old female who presents to [facility] for  pelvic pressure , which occured 4 day(s) prior to arrival. Symptoms are associated with frequency and urgency. She has a history of recurrent UTI. Denies gross hematuria. Denies associated flank pain. Denies any fever, chills, vaginal discharge, vaginal bleeding, possibility of pregnancy, vomiting, diarrhea, or lethargy. No LMP recorded. Patient is postmenopausal.    Review of Systems   Unless otherwise stated in this report or unable to obtain because of the patient's clinical or mental status as evidenced by the medical record, this patients's positive and negative responses for Review of Systems, constitutional, psych, eyes, ENT, cardiovascular, respiratory, gastrointestinal, neurological, genitourinary, musculoskeletal, integument systems and systems related to the presenting problem are either stated in the preceding or were not pertinent or were negative for the symptoms and/or complaints related to the medical problem. Past Medical History:  has a past medical history of Cervical cancer ([address] and Inflammatory arthritis. Past Surgical History:  has a past surgical history that includes Salivary gland surgery (2006); Breast cyst excision (Left, 2009); and Cholecystectomy, laparoscopic (N/A, 2013). Social History:  reports that she has never smoked. She has never used smokeless tobacco. She reports current alcohol use of about 1.0 standard drink per week. She reports that she does not use drugs.   Family History: family history includes Coronary Art Dis in her brother; Other in her father and mother. Allergies: Patient has no known allergies. Physical Exam   Vital Signs:  /82   Pulse 64   Temp 97.5 °F (36.4 °C)   Resp 18   Ht 5' 7\" (1.702 m)   Wt 139 lb 12.8 oz (63.4 kg)   SpO2 98%   BMI 21.90 kg/m²    Oxygen Saturation Interpretation: Normal.    Constitutional:  A&Ox3, development consistent with age, NAD. Lungs:  CTAB without wheezing, rales, or rhonchi. Heart:  RRR without pathologic murmurs, rubs, or gallops. Abdomen: Soft, nondistended, with mild suprapubic tenderness. No rebound, rigidity, or guarding. BS+ X4. No organomegaly. Back: No CVA tenderness. Skin:  Normal turgor. Warm, dry, without visible rash, unless noted elsewhere. Neurological:  Alert and oriented. Motor functions intact. Responds to verbal commands. Test Results Section   (All laboratory and radiology results have been personally reviewed by myself)  Labs:  Results for orders placed or performed in visit on 07/19/22   POCT Urinalysis no Micro   Result Value Ref Range    Color, UA yellow     Clarity, UA clear     Glucose, UA POC negative     Bilirubin, UA negative     Ketones, UA negative     Spec Grav, UA 1.015     Blood, UA POC negative     pH, UA 7.0     Protein, UA POC negative     Urobilinogen, UA 0.2     Leukocytes, UA negative     Nitrite, UA negative      Imaging: All Radiology results interpreted by Radiologist unless otherwise noted. No results found. Medical Decision Making   Patient is well appearing, non toxic and appropriate for outpatient management. Plan is for symptom management and PCP follow up. Assessment / Plan   Impression(s):  Chepe Justice was seen today for urinary frequency. Diagnoses and all orders for this visit:    Urinary tract infection without hematuria, site unspecified  -     sulfamethoxazole-trimethoprim (BACTRIM DS;SEPTRA DS) 800-160 MG per tablet;  Take 1 tablet by mouth in the morning and 1 tablet before bedtime. Do all this for 7 days. -     Culture, Urine; Future    Urinary frequency  -     POCT Urinalysis no Micro    UA appears negative for a UTI, however, based on clinical symptoms, I will treat for suspected UTI. Urine C&S pending, will call with results once available. Script written for Bactrim, side effects discussed. Increase fluids and rest. F/u PCP in 3-5 days if symptoms persist. ED sooner if symptoms worsen or change. ED immediately with the development of fever, shaking chills, body aches, flank pain, vomiting, CP, or SOB. Pt is in agreement with this care plan. All questions answered. Return if symptoms worsen or fail to improve. Electronically signed by DRE Sheffield CNP   DD: 7/19/22    **This report was transcribed using voice recognition software. Every effort was made to ensure accuracy; however, inadvertent computerized transcription errors may be present.

## 2022-07-22 LAB — URINE CULTURE, ROUTINE: NORMAL

## 2022-12-21 ENCOUNTER — OFFICE VISIT (OUTPATIENT)
Dept: FAMILY MEDICINE CLINIC | Age: 62
End: 2022-12-21
Payer: COMMERCIAL

## 2022-12-21 VITALS
HEART RATE: 65 BPM | TEMPERATURE: 97.5 F | WEIGHT: 140 LBS | SYSTOLIC BLOOD PRESSURE: 130 MMHG | BODY MASS INDEX: 21.93 KG/M2 | DIASTOLIC BLOOD PRESSURE: 80 MMHG | OXYGEN SATURATION: 100 %

## 2022-12-21 DIAGNOSIS — R07.9 CHEST PAIN, UNSPECIFIED TYPE: ICD-10-CM

## 2022-12-21 DIAGNOSIS — K21.9 GASTROESOPHAGEAL REFLUX DISEASE WITHOUT ESOPHAGITIS: ICD-10-CM

## 2022-12-21 DIAGNOSIS — M19.90 INFLAMMATORY ARTHRITIS: Primary | ICD-10-CM

## 2022-12-21 LAB
ALBUMIN SERPL-MCNC: 4.8 G/DL (ref 3.5–5.2)
ALP BLD-CCNC: 66 U/L (ref 35–104)
ALT SERPL-CCNC: 20 U/L (ref 0–32)
ANION GAP SERPL CALCULATED.3IONS-SCNC: 11 MMOL/L (ref 7–16)
AST SERPL-CCNC: 24 U/L (ref 0–31)
BASOPHILS ABSOLUTE: 0.02 E9/L (ref 0–0.2)
BASOPHILS RELATIVE PERCENT: 0.4 % (ref 0–2)
BILIRUB SERPL-MCNC: 0.3 MG/DL (ref 0–1.2)
BUN BLDV-MCNC: 10 MG/DL (ref 6–23)
CALCIUM SERPL-MCNC: 10.2 MG/DL (ref 8.6–10.2)
CHLORIDE BLD-SCNC: 104 MMOL/L (ref 98–107)
CO2: 25 MMOL/L (ref 22–29)
CREAT SERPL-MCNC: 0.9 MG/DL (ref 0.5–1)
EOSINOPHILS ABSOLUTE: 0.09 E9/L (ref 0.05–0.5)
EOSINOPHILS RELATIVE PERCENT: 1.9 % (ref 0–6)
GFR SERPL CREATININE-BSD FRML MDRD: >60 ML/MIN/1.73
GLUCOSE BLD-MCNC: 85 MG/DL (ref 74–99)
HCT VFR BLD CALC: 41.3 % (ref 34–48)
HEMOGLOBIN: 13.3 G/DL (ref 11.5–15.5)
IMMATURE GRANULOCYTES #: 0.01 E9/L
IMMATURE GRANULOCYTES %: 0.2 % (ref 0–5)
LYMPHOCYTES ABSOLUTE: 1.64 E9/L (ref 1.5–4)
LYMPHOCYTES RELATIVE PERCENT: 34.5 % (ref 20–42)
MCH RBC QN AUTO: 30.2 PG (ref 26–35)
MCHC RBC AUTO-ENTMCNC: 32.2 % (ref 32–34.5)
MCV RBC AUTO: 93.9 FL (ref 80–99.9)
MONOCYTES ABSOLUTE: 0.46 E9/L (ref 0.1–0.95)
MONOCYTES RELATIVE PERCENT: 9.7 % (ref 2–12)
NEUTROPHILS ABSOLUTE: 2.53 E9/L (ref 1.8–7.3)
NEUTROPHILS RELATIVE PERCENT: 53.3 % (ref 43–80)
PDW BLD-RTO: 12.3 FL (ref 11.5–15)
PLATELET # BLD: 283 E9/L (ref 130–450)
PMV BLD AUTO: 10.7 FL (ref 7–12)
POTASSIUM SERPL-SCNC: 4.4 MMOL/L (ref 3.5–5)
RBC # BLD: 4.4 E12/L (ref 3.5–5.5)
SODIUM BLD-SCNC: 140 MMOL/L (ref 132–146)
TOTAL PROTEIN: 7.7 G/DL (ref 6.4–8.3)
TROPONIN, HIGH SENSITIVITY: 10 NG/L (ref 0–9)
WBC # BLD: 4.8 E9/L (ref 4.5–11.5)

## 2022-12-21 PROCEDURE — 93000 ELECTROCARDIOGRAM COMPLETE: CPT | Performed by: INTERNAL MEDICINE

## 2022-12-21 PROCEDURE — 99214 OFFICE O/P EST MOD 30 MIN: CPT | Performed by: INTERNAL MEDICINE

## 2022-12-21 RX ORDER — PANTOPRAZOLE SODIUM 40 MG/1
40 TABLET, DELAYED RELEASE ORAL
Qty: 30 TABLET | Refills: 5 | Status: SHIPPED | OUTPATIENT
Start: 2022-12-21

## 2022-12-21 ASSESSMENT — ENCOUNTER SYMPTOMS
ALLERGIC/IMMUNOLOGIC NEGATIVE: 1
RESPIRATORY NEGATIVE: 1
EYES NEGATIVE: 1

## 2022-12-21 NOTE — PROGRESS NOTES
2022    Kaitlynn Mclean (:  1960) is a 58 y.o. female, here for evaluation of the following medical concerns:    Patient had a history previously of gastroesophageal reflux disease. She has had previous cholecystectomy. 2 to 3 weeks ago she developed excessive heartburn. This is especially worsened at nighttime. She states this causes chest pain and pressure. It is not associated with nausea, vomiting, diaphoresis, shortness of breath. She is not having the symptoms with exertion. She denies danger symptoms including odynophagia, dysphagia, black tarry stools, weight loss or change in bowel habits. Since she had a colonoscopy about 5 or 6 years ago she said that she has had some rectal leakage. She denies any urinary incontinence. Patient's family history includes scleroderma in her mother. She was evaluated at [facility] clinic rheumatology previously for possible inflammatory arthritis and was trialed on Plaquenil without much improvement. She was then taken off the medication. Patient does have some knee discomfort and ankle discomfort. She notices some swelling of her fingers. She did have an EGD at the time of her colonoscopy and I will request both EGD and colonoscopy reports. There is a family history of early coronary disease in her brother. She is denying nonsteroidal use on a regular basis. Hypertension    Other    Dizziness        Review of Systems   Constitutional: Negative. HENT: Negative. Eyes: Negative. Respiratory: Negative. Cardiovascular: Negative. Gastrointestinal:         Symptoms of GERD. Denies danger symptoms including odynophagia, dysphagia, early satiety. Previous EGD by Dr. Tamara Garzon. Endocrine: Negative. Genitourinary: Negative. Musculoskeletal:         Inflammatory arthritic changes of hands and feet. This includes MCP swelling of the first through third digits and occasional finger swelling.   Swelling of MTP joints in the same pattern. Worse in the summer with the heat improves with ice/cold water. The patient is not regularly taking nonsteroidals. Skin: Negative. Allergic/Immunologic: Negative. Neurological:  Positive for dizziness. Hematological: Negative. Psychiatric/Behavioral: Negative. Prior to Visit Medications    Medication Sig Taking? Authorizing Provider   albuterol sulfate HFA (VENTOLIN HFA) 108 (90 Base) MCG/ACT inhaler Inhale 2 puffs into the lungs 4 times daily as needed for Wheezing  Patient not taking: No sig reported  Jesse Perry, DO   methylPREDNISolone (MEDROL DOSEPACK) 4 MG tablet Take by mouth. Patient not taking: No sig reported  Jesse Perry, DO   fluticasone (FLONASE) 50 MCG/ACT nasal spray 1-2 sprays, each nostril daily as needed for nasal congestion. Patient not taking: No sig reported  Arnita Spatz., APRN - CNP   cetirizine (ZYRTEC ALLERGY) 10 MG tablet Take 1 tablet by mouth daily  Patient not taking: No sig reported  Arnita Spatz., APRN - CNP   omeprazole (PRILOSEC) 20 MG delayed release capsule Take 1 capsule by mouth every morning (before breakfast)  Patient not taking: No sig reported  Jose Eduardo Calvo MD        No Known Allergies    Past Medical History:   Diagnosis Date    Cervical cancer ([facility] [address] 2018    [facility]    Inflammatory arthritis 2008    [facility]       Past Surgical History:   Procedure Laterality Date    BREAST CYST EXCISION Left 2009    CHOLECYSTECTOMY, LAPAROSCOPIC N/A 2013    [facility]    SALIVARY GLAND SURGERY  [address] [place] [address]       Social History     Socioeconomic History    Marital status:      Spouse name: Not on file    Number of children: 3    Years of education: Not on file    Highest education level: Not on file   Occupational History    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 Exercise is minimal but does housework on a daily basis. Social Determinants of Health     Financial Resource Strain: Not on file   Food Insecurity: Not on file   Transportation Needs: Not on file   Physical Activity: Not on file   Stress: Not on file   Social Connections: Not on file   Intimate Partner Violence: Not on file   Housing Stability: Not on file        Family History   Problem Relation Age of Onset    Other Mother         scleroderma    Other Father         valvular heart disease-endocarditis    Coronary Art Dis Brother         MI age 43- valve replacement-ICD       Vitals:    12/21/22 0857   BP: 130/80   Pulse: 65   Temp: 97.5 °F (36.4 °C)   SpO2: 100%   Weight: 140 lb (63.5 kg)     Estimated body mass index is 21.93 kg/m² as calculated from the following:    Height as of 7/19/22: 5' 7\" (1.702 m). Weight as of this encounter: 140 lb (63.5 kg). Physical Exam  Constitutional:       Appearance: She is well-developed. HENT:      Head: Normocephalic. Right Ear: External ear normal.      Left Ear: External ear normal.      Nose: Nose normal.   Eyes:      Pupils: Pupils are equal, round, and reactive to light. Cardiovascular:      Rate and Rhythm: Normal rate and regular rhythm. Heart sounds: Normal heart sounds. Pulmonary:      Breath sounds: Normal breath sounds. Abdominal:      General: Bowel sounds are normal.      Palpations: Abdomen is soft. There is no mass. Tenderness: There is no abdominal tenderness. There is no guarding or rebound. Genitourinary:     Comments: Had recent Pap pelvic and breast examination by Dr. Briana Hamm  Musculoskeletal:         General: Normal range of motion. Cervical back: Normal range of motion. Comments: Minimal synovial swelling which appears to be chronic of the first through third MCP joints of both hands. No significant swelling of the synovium of the left wrist with slight swelling of the right wrist.  These appear to be chronic in nature. Same pattern of MTP joint swelling and toe swelling bilaterally. Skin:     General: Skin is warm and dry. Neurological:      Mental Status: She is alert and oriented to person, place, and time. Pb LARES was seen today for other and dizziness. Diagnoses and all orders for this visit:    Inflammatory arthritis  -     [address], Rheumatology, [place]    Gastroesophageal reflux disease without esophagitis  -     CBC with Auto Differential; Future    Chest pain, unspecified type  -     CBC with Auto Differential; Future  -     Comprehensive Metabolic Panel; Future  -     EKG 12 Lead  -     Troponin; Future    Other orders  -     pantoprazole (PROTONIX) 40 MG tablet; Take 1 tablet by mouth every morning (before breakfast)  The patient will be treated with pantoprazole. We will have her back to assess her response to this medication. Lab work will be obtained today. I doubt this is an atypical presentation of coronary disease but there is a family history of early disease. Patient does not have significantly elevated cholesterol levels. Plan is to get an EKG and check troponins. She did have an episode of this chest discomfort last night. I will see her back in approximately 2 to 3 weeks to reassess.

## 2022-12-21 NOTE — LETTER
[address] [place] [address]  Phone: [phone]  Fax: [phone]    Faye Flowers MD        December 21, 2022     Patient: Yvette Zepeda   YOB: 1960   Date of Visit: 12/21/2022       To Whom It May Concern:     Yvette Zepeda was seen in our office today 12/21/2022.         Sincerely,        Faye Flowers MD

## 2022-12-22 DIAGNOSIS — R07.89 OTHER CHEST PAIN: Primary | ICD-10-CM

## 2022-12-22 DIAGNOSIS — R07.89 OTHER CHEST PAIN: ICD-10-CM

## 2022-12-22 LAB
TROPONIN, HIGH SENSITIVITY: 11 NG/L (ref 0–9)
TROPONIN, HIGH SENSITIVITY: 9 NG/L (ref 0–9)

## 2023-01-06 ENCOUNTER — TELEPHONE (OUTPATIENT)
Dept: CARDIOLOGY CLINIC | Age: 63
End: 2023-01-06

## 2023-01-06 NOTE — TELEPHONE ENCOUNTER
Patient Appointment Form:      PCP: Kristin Lou  Referring: Kristin Lou    Has the Patient:    Seen a Cardiologist? no    Had a heart catheterization? no    Had heart surgery? no    Had a stress test or nuclear stress test? no    Had an echocardiogram? no    Had a vascular ultrasound? no    Had a 24/48 heart monitor or extended cardiac event monitor? no    Had recent blood work in the last 6 months? yes    date: 12/21-12/22/2022    ordering physician: Kristin Lou    Had a pacemaker/ICD/ILR implant? no    Seen an Electrophysiologist? no        Will send records via: in [address] [place] [address] Rd      Date & time of appointment: 01/20/2023 BOOKER cook by PCP

## 2023-01-18 ENCOUNTER — OFFICE VISIT (OUTPATIENT)
Dept: FAMILY MEDICINE CLINIC | Age: 63
End: 2023-01-18
Payer: COMMERCIAL

## 2023-01-18 VITALS
HEART RATE: 65 BPM | TEMPERATURE: 98.4 F | DIASTOLIC BLOOD PRESSURE: 60 MMHG | WEIGHT: 138 LBS | OXYGEN SATURATION: 97 % | BODY MASS INDEX: 21.61 KG/M2 | SYSTOLIC BLOOD PRESSURE: 110 MMHG

## 2023-01-18 DIAGNOSIS — K21.9 GASTROESOPHAGEAL REFLUX DISEASE WITHOUT ESOPHAGITIS: Primary | ICD-10-CM

## 2023-01-18 DIAGNOSIS — R07.89 CHEST PRESSURE: ICD-10-CM

## 2023-01-18 DIAGNOSIS — M19.90 INFLAMMATORY ARTHRITIS: ICD-10-CM

## 2023-01-18 PROCEDURE — 99213 OFFICE O/P EST LOW 20 MIN: CPT | Performed by: INTERNAL MEDICINE

## 2023-01-18 RX ORDER — NITROGLYCERIN 0.4 MG/1
0.4 TABLET SUBLINGUAL EVERY 5 MIN PRN
Qty: 25 TABLET | Refills: 3 | Status: SHIPPED | OUTPATIENT
Start: 2023-01-18

## 2023-01-18 ASSESSMENT — ENCOUNTER SYMPTOMS
ALLERGIC/IMMUNOLOGIC NEGATIVE: 1
EYES NEGATIVE: 1
RESPIRATORY NEGATIVE: 1

## 2023-01-18 NOTE — PROGRESS NOTES
2023    Juan Lomax (:  1960) is a 58 y.o. female, here for evaluation of the following medical concerns:    Unfortunately the patient did not respond and is still continuing to have symptoms of chest pressure which will wake her at night. The last episode was quite severe and the patient did become diaphoretic. Whenever she takes carbonated beverages etc. during these episodes and finally burps quite a bit some of the symptoms do resolve but she still has some soreness in the chest especially the next day. The patient's chest x-ray done previously did not reveal a hiatal hernia. The patient did have an EGD previously but I do not have that result. Patient previously had been on Prevacid many years ago but had gone off . She is not having odynophagia, dysphagia, early satiety, weight loss or melanotic stool. Patient does have an appointment with cardiology in 2 days. I asked her to try to use simethicone to see if this is helpful but I also gave her sublingual nitroglycerin. I would like her to trial this if she is able to if she has symptoms before seeing cardiology. Hypertension    Other    Dizziness        Review of Systems   Constitutional: Negative. HENT: Negative. Eyes: Negative. Respiratory: Negative. Cardiovascular: Negative. Gastrointestinal:         Symptoms of GERD. Denies danger symptoms including odynophagia, dysphagia, early satiety. Previous EGD by Dr. Wendy Sanchez. Endocrine: Negative. Genitourinary: Negative. Musculoskeletal:         Inflammatory arthritic changes of hands and feet. This includes MCP swelling of the first through third digits and occasional finger swelling. Swelling of MTP joints in the same pattern. Worse in the summer with the heat improves with ice/cold water. The patient is not regularly taking nonsteroidals. Skin: Negative. Allergic/Immunologic: Negative. Neurological:  Positive for dizziness.    Hematological: Negative. Psychiatric/Behavioral: Negative. Prior to Visit Medications    Medication Sig Taking? Authorizing Provider   pantoprazole (PROTONIX) 40 MG tablet Take 1 tablet by mouth every morning (before breakfast) Yes Jack Lowe MD   albuterol sulfate HFA (VENTOLIN HFA) 108 (90 Base) MCG/ACT inhaler Inhale 2 puffs into the lungs 4 times daily as needed for Wheezing  Patient not taking: No sig reported  Jesse Perry, DO   methylPREDNISolone (MEDROL DOSEPACK) 4 MG tablet Take by mouth. Patient not taking: No sig reported  Jesse Perry, DO   fluticasone (FLONASE) 50 MCG/ACT nasal spray 1-2 sprays, each nostril daily as needed for nasal congestion. Patient not taking: No sig reported  Chance Ates., APRN - CNP   cetirizine (ZYRTEC ALLERGY) 10 MG tablet Take 1 tablet by mouth daily  Patient not taking: No sig reported  [place] Ates., APRN - CNP   omeprazole (PRILOSEC) 20 MG delayed release capsule Take 1 capsule by mouth every morning (before breakfast)  Patient not taking: Reported on 1/18/2023  Jack Lowe MD        No Known Allergies    Past Medical History:   Diagnosis Date    Cervical cancer ([facility] [address] 2018    [address]    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 Exercise is minimal but does housework on a daily basis. Social Determinants of Health     Financial Resource Strain: Not on file   Food Insecurity: Not on file   Transportation Needs: Not on file   Physical Activity: Not on file   Stress: Not on file   Social Connections: Not on file   Intimate Partner Violence: Not on file   Housing Stability: Not on file        Family History   Problem Relation Age of Onset    Other Mother         scleroderma    Other Father         valvular heart disease-endocarditis    Coronary Art Dis Brother         MI age 43- valve replacement-ICD       Vitals:    01/18/23 1608   BP: 110/60   Pulse: 65   Temp: 98.4 °F (36.9 °C)   SpO2: 97%   Weight: 138 lb (62.6 kg)     Estimated body mass index is 21.61 kg/m² as calculated from the following:    Height as of 7/19/22: 5' 7\" (1.702 m). Weight as of this encounter: 138 lb (62.6 kg). Physical Exam  Constitutional:       Appearance: She is well-developed. HENT:      Head: Normocephalic. Right Ear: External ear normal.      Left Ear: External ear normal.      Nose: Nose normal.   Eyes:      Pupils: Pupils are equal, round, and reactive to light. Cardiovascular:      Rate and Rhythm: Normal rate and regular rhythm. Heart sounds: Normal heart sounds. Pulmonary:      Breath sounds: Normal breath sounds. Abdominal:      General: Bowel sounds are normal.      Palpations: Abdomen is soft. There is no mass. Tenderness: There is no abdominal tenderness. There is no guarding or rebound. Genitourinary:     Comments: Had recent Pap pelvic and breast examination by Dr. Diogo Oneal  Musculoskeletal:         General: Normal range of motion. Cervical back: Normal range of motion. Comments: Minimal synovial swelling which appears to be chronic of the first through third MCP joints of both hands.   No significant swelling of the synovium of the left wrist with slight swelling of the right wrist.  These appear to be chronic in nature. Same pattern of MTP joint swelling and toe swelling bilaterally. Skin:     General: Skin is warm and dry. Neurological:      Mental Status: She is alert and oriented to person, place, and time. Kimberly LARES was seen today for follow-up. Diagnoses and all orders for this visit:    Gastroesophageal reflux disease without esophagitis    Chest pressure    Inflammatory arthritis    Other orders  -     nitroGLYCERIN (NITROSTAT) 0.4 MG SL tablet; Place 1 tablet under the tongue every 5 minutes as needed for Chest pain up to max of 3 total doses. If no relief after 1 dose, call 911. I will follow her up after cardiology sees the patient. She does have a very strong family history of coronary artery disease. Sublingual nitroglycerin is given.

## 2023-01-20 ENCOUNTER — TELEPHONE (OUTPATIENT)
Dept: FAMILY MEDICINE CLINIC | Age: 63
End: 2023-01-20

## 2023-01-20 ENCOUNTER — OFFICE VISIT (OUTPATIENT)
Dept: CARDIOLOGY CLINIC | Age: 63
End: 2023-01-20

## 2023-01-20 VITALS
DIASTOLIC BLOOD PRESSURE: 78 MMHG | RESPIRATION RATE: 16 BRPM | WEIGHT: 139.3 LBS | HEIGHT: 66 IN | SYSTOLIC BLOOD PRESSURE: 112 MMHG | BODY MASS INDEX: 22.39 KG/M2 | HEART RATE: 63 BPM

## 2023-01-20 DIAGNOSIS — Z82.49 FAMILY HISTORY OF PREMATURE CAD: ICD-10-CM

## 2023-01-20 DIAGNOSIS — R07.9 CHEST PAIN, UNSPECIFIED TYPE: Primary | ICD-10-CM

## 2023-01-20 DIAGNOSIS — R94.31 ABNORMAL EKG: ICD-10-CM

## 2023-01-20 NOTE — TELEPHONE ENCOUNTER
----- Message from Lisa Mendez sent at 1/19/2023 10:54 AM EST -----  Subject: Message to Provider    QUESTIONS  Information for Provider? Patient took her daughter to the office on 1/17   after an allergic reaction to medication and is requesting a note for her   work that states that she needed to be there. Please fax to [phone]   ---------------------------------------------------------------------------  --------------  [address]  [phone]; OK to leave message on voicemail  ---------------------------------------------------------------------------  --------------  SCRIPT ANSWERS  Relationship to Patient?  Self

## 2023-01-20 NOTE — PROGRESS NOTES
OUTPATIENT CARDIOLOGY CONSULT    Name: Vashti Lang    Age: 58 y.o. Date of Service: 1/20/2023    Reason for Consultation: Chest pain    Referring Physician: Bobbi Gutierrez MD    History of Present Illness:  Vashti Lang is a 58 y.o. female who presents today for further evaluation of chest pain. States for the past few months has been having episodes of chest discomfort that occur primarily at night, has happened a few times a month. Feels like a severe gas bubble in the chest that is relieved with belching, associate with nausea and shortness of breath. Has woken her up before. Does not really occur during the day or with exertion. She works a desk job. She takes care of the house, works outside in the warmer weather, has 8 grandchildren and is generally fairly active. No history of hypertension, diabetes, prior heart disease. Family history of heart disease including brother had an MI in his 45s, did also valve surgery and has defibrillator. Father had valve surgery and noted his siblings. She thinks it was the aortic valve. Review of Systems:  Complete review of systems otherwise negative except as described above.     Past Medical History:  Past Medical History:   Diagnosis Date    Cervical cancer ([facility] [address] 2018    [facility]    Inflammatory arthritis 2008    [facility]- Rheumatology       Past Surgical History:  Past Surgical History:   Procedure Laterality Date    BREAST CYST EXCISION Left 2009    CHOLECYSTECTOMY, LAPAROSCOPIC N/A 2013    [facility]    SALIVARY GLAND SURGERY  [address] [place] [address]       Family History:  Family History   Problem Relation Age of Onset    Other Mother         scleroderma    Other Father         valvular heart disease-endocarditis    Coronary Art Dis Brother         MI age 43- valve replacement-ICD       Social History:  Social History     Tobacco Use    Smoking status: Never    Smokeless tobacco: Never   Vaping Use    Vaping Use: Never used   Substance Use Topics Alcohol use: Yes     Alcohol/week: 1.0 standard drink     Types: 1 Glasses of wine per week     Comment: social    Drug use: Never        Allergies: Allergies   Allergen Reactions    Mixed Feathers Other (See Comments)       Current Medications:    Current Outpatient Medications:     Simethicone (GAS-X PO), Take by mouth daily, Disp: , Rfl:     nitroGLYCERIN (NITROSTAT) 0.4 MG SL tablet, Place 1 tablet under the tongue every 5 minutes as needed for Chest pain up to max of 3 total doses. If no relief after 1 dose, call 911. (Patient not taking: Reported on 1/20/2023), Disp: 25 tablet, Rfl: 3    pantoprazole (PROTONIX) 40 MG tablet, Take 1 tablet by mouth every morning (before breakfast) (Patient not taking: Reported on 1/20/2023), Disp: 30 tablet, Rfl: 5    albuterol sulfate HFA (VENTOLIN HFA) 108 (90 Base) MCG/ACT inhaler, Inhale 2 puffs into the lungs 4 times daily as needed for Wheezing (Patient not taking: No sig reported), Disp: 54 g, Rfl: 1    methylPREDNISolone (MEDROL DOSEPACK) 4 MG tablet, Take by mouth. (Patient not taking: No sig reported), Disp: 1 kit, Rfl: 0    fluticasone (FLONASE) 50 MCG/ACT nasal spray, 1-2 sprays, each nostril daily as needed for nasal congestion.  (Patient not taking: No sig reported), Disp: 16 g, Rfl: 0    cetirizine (ZYRTEC ALLERGY) 10 MG tablet, Take 1 tablet by mouth daily (Patient not taking: No sig reported), Disp: 30 tablet, Rfl: 0    omeprazole (PRILOSEC) 20 MG delayed release capsule, Take 1 capsule by mouth every morning (before breakfast) (Patient not taking: No sig reported), Disp: 90 capsule, Rfl: 1    Physical Exam:  /78   Pulse 63   Resp 16   Ht 5' 6\" (1.676 m)   Wt 139 lb 4.8 oz (63.2 kg)   BMI 22.48 kg/m²   Wt Readings from Last 3 Encounters:   01/20/23 139 lb 4.8 oz (63.2 kg)   01/18/23 138 lb (62.6 kg)   12/21/22 140 lb (63.5 kg)     Appearance: Awake, alert, no acute respiratory distress  Skin: Intact, no rash  Eyes: EOMI, no conjunctival erythema  ENMT: Moist mucous membranes. Neck: Supple, no elevated JVP, no carotid bruits  Lungs: Clear to auscultation bilaterally. No wheezes, rales, or rhonchi. Cardiac: Regular rhythm with a normal rate. S1 & S2 normal, no murmurs  Abdomen: Soft, nontender, +bowel sounds  Extremities: Moves all extremities x 4, no lower extremity edema  Neurologic: No focal motor deficits apparent, normal mood and affect  Peripheral Pulses: Intact posterior tibial pulses bilaterally    Laboratory Tests:  Lab Results   Component Value Date    CREATININE 0.9 12/21/2022    BUN 10 12/21/2022     12/21/2022    K 4.4 12/21/2022     12/21/2022    CO2 25 12/21/2022     No results found for: MG  Lab Results   Component Value Date    WBC 4.8 12/21/2022    HGB 13.3 12/21/2022    HCT 41.3 12/21/2022    MCV 93.9 12/21/2022     12/21/2022     Lab Results   Component Value Date    ALT 20 12/21/2022    AST 24 12/21/2022    ALKPHOS 66 12/21/2022    BILITOT 0.3 12/21/2022     No results found for: CKTOTAL, CKMB, CKMBINDEX, TROPONINI  No results found for: INR, PROTIME  Lab Results   Component Value Date    TSH 3.840 10/31/2019     Lab Results   Component Value Date    LABA1C 5.6 10/31/2018     No results found for: EAG  Lab Results   Component Value Date    CHOL 184 10/31/2019    CHOL 168 10/17/2019    CHOL 184 10/31/2018     Lab Results   Component Value Date    TRIG 111 10/31/2019    TRIG 111 10/17/2019    TRIG 111 10/31/2018     Lab Results   Component Value Date    HDL 53 10/31/2019    HDL 54 10/17/2019    HDL 53 10/31/2018     Lab Results   Component Value Date    LDLCALC 109 10/31/2019    LDLCALC 92 10/17/2019    LDLCALC 109 10/31/2018     Lab Results   Component Value Date    VLDL 22 10/31/2019    VLDL 22 10/17/2019     Lab Results   Component Value Date    CHOLHDLRATIO 3.5 10/31/2019    CHOLHDLRATIO 3.1 10/17/2019    CHOLHDLRATIO 3.5 10/31/2018     No results for input(s): PROBNP in the last 72 hours.     Cardiac Tests:  EC2023: Sinus rhythm 63 beats minute. Normal axis and intervals. Nonspecific T wave changes. Echocardiogram:     Stress test:      Cardiac catheterization:     Orders Placed This Encounter   Procedures    NM Cardiac Stress Test Nuclear Imaging    Cardiac Stress Test Exercise- Treadmill    EKG 12 Lead    ECHO COMPLETE        Requested Prescriptions      No prescriptions requested or ordered in this encounter        ASSESSMENT / PLAN:  Atypical chest pain. Nonanginal, likely GI related  Abnormal EKG slight T wave inversions  Inflammatory arthritis  GERD  Family history premature CAD: Brother MI 43  History of cervical cancer    Recommendations: Though symptoms are atypical, with abnormal EKG and strong family history, and has never had an ischemic evaluation, would recommend proceeding with a stress test.    Exercise SPECT MPI  Echocardiogram given strong family history of valvular disease, check for bicuspid aortic valve  ER if symptoms worsen  If cardiac work-up unremarkable, consider GI evaluation  Aggressive risk factor modification  Further recommendation pending review of above testing    Thank you for allowing me to participate in your patient's care. Please feel free to contact me if you have any questions or concerns.     Db Santacruz MD, [facility]

## 2023-01-20 NOTE — TELEPHONE ENCOUNTER
Called patient and this is no longer needed it was taken care of.   Also, she cardiology today and they are scheduling her for an echo and stress test

## 2023-01-27 ENCOUNTER — TELEPHONE (OUTPATIENT)
Dept: CARDIOLOGY | Age: 63
End: 2023-01-27

## 2023-01-27 NOTE — TELEPHONE ENCOUNTER
Left message on voice mail to remind patient of nuclear stress test and echocardiogram appointment on January 31, 2023 at 0800. Instructions for test,medications, and COVID-19 preprocedure information left on voice mail. Asked patient to call with any questions, changes to medication list  or if unable to keep appointment.

## 2023-01-31 ENCOUNTER — HOSPITAL ENCOUNTER (OUTPATIENT)
Dept: CARDIOLOGY | Age: 63
Discharge: HOME | End: 2023-01-31
Payer: COMMERCIAL

## 2023-01-31 ENCOUNTER — APPOINTMENT (OUTPATIENT)
Dept: CARDIOLOGY | Age: 63
End: 2023-01-31
Payer: COMMERCIAL

## 2023-01-31 VITALS
WEIGHT: 138 LBS | DIASTOLIC BLOOD PRESSURE: 70 MMHG | HEIGHT: 66 IN | RESPIRATION RATE: 16 BRPM | BODY MASS INDEX: 22.18 KG/M2 | HEART RATE: 58 BPM | SYSTOLIC BLOOD PRESSURE: 108 MMHG

## 2023-01-31 DIAGNOSIS — R07.9 CHEST PAIN, UNSPECIFIED TYPE: ICD-10-CM

## 2023-01-31 DIAGNOSIS — R07.9 CHEST PAIN, UNSPECIFIED TYPE: Primary | ICD-10-CM

## 2023-01-31 LAB
LV EF: 58 %
LV EF: 67 %
LVEF MODALITY: NORMAL
LVEF MODALITY: NORMAL

## 2023-01-31 PROCEDURE — 93017 CV STRESS TEST TRACING ONLY: CPT

## 2023-01-31 PROCEDURE — A9500 TC99M SESTAMIBI: HCPCS | Performed by: INTERNAL MEDICINE

## 2023-01-31 PROCEDURE — 93306 TTE W/DOPPLER COMPLETE: CPT

## 2023-01-31 PROCEDURE — 2580000003 HC RX 258: Performed by: INTERNAL MEDICINE

## 2023-01-31 PROCEDURE — 78452 HT MUSCLE IMAGE SPECT MULT: CPT

## 2023-01-31 PROCEDURE — 3430000000 HC RX DIAGNOSTIC RADIOPHARMACEUTICAL: Performed by: INTERNAL MEDICINE

## 2023-01-31 RX ORDER — TECHNETIUM TC-99M SESTAMIBI 1 MG/10ML
36.8 INJECTION INTRAVENOUS
Status: COMPLETED | OUTPATIENT
Start: 2023-01-31 | End: 2023-01-31

## 2023-01-31 RX ORDER — SODIUM CHLORIDE 0.9 % (FLUSH) 0.9 %
10 SYRINGE (ML) INJECTION PRN
Status: DISCONTINUED | OUTPATIENT
Start: 2023-01-31 | End: 2023-02-01 | Stop reason: HOSPADM

## 2023-01-31 RX ORDER — TECHNETIUM TC-99M SESTAMIBI 1 MG/10ML
10.7 INJECTION INTRAVENOUS
Status: COMPLETED | OUTPATIENT
Start: 2023-01-31 | End: 2023-01-31

## 2023-01-31 RX ADMIN — Medication 10.7 MILLICURIE: at 07:46

## 2023-01-31 RX ADMIN — SODIUM CHLORIDE, PRESERVATIVE FREE 10 ML: 5 INJECTION INTRAVENOUS at 09:11

## 2023-01-31 RX ADMIN — SODIUM CHLORIDE, PRESERVATIVE FREE 10 ML: 5 INJECTION INTRAVENOUS at 07:46

## 2023-01-31 RX ADMIN — Medication 36.8 MILLICURIE: at 09:11

## 2023-01-31 NOTE — RESULT ENCOUNTER NOTE
Stress test looks good, normal blood flow to the heart and normal pumping function. No signs of any blockages. Had good exercise capacity. Overall low risk stress test and not suggestive of any underlying obstructive coronary artery disease. Echo pending, will review. Follow-up with primary care and consider GI work-up for chest pain.

## 2023-01-31 NOTE — DISCHARGE INSTRUCTIONS
[address] [facility]      Instructions to Patients    The following are the instructions for patients who have had a procedure in our office today. Patient name: Yola Alba    Radionuclide Activity: 40mCi of 99mTc-Sestamibi    Date Administered: 1/31/2023    Expires: 48 hours after scheduled appointment time      Patient may resume normal activity unless otherwise instructed. Patient may resume medications as normal.  If the need should arise, patient may call [phone] between the hours of 8:00am-5:00pm.  After hours there is at least one physician on-call at all times for those patients needing assistance. Patients may call [phone] and the answering service will direct the patient to one of our physicians for assistance. After the patient's test if they are going to be leaving from an airport in the near future they should take this letter with them to verify the test and radionuclide used for their test.      This letter verifies that the above named bearer received an injection of a radionuclide for medical purpose/usage only.         Electronically signed by Lyudmila Nolasco on 1/31/2023 at 9:06 AM

## 2023-01-31 NOTE — PROCEDURES
[address] [facility] [address]  [phone]                Exercise Stress Nuclear Gated SPECT Study    Name: [address] Account Number: [de-identified]    :  1960      Sex: female              Date of Study:  2023    Height: 5' 6\" (167.6 cm)  Weight: 138 lb (62.6 kg)     Ordering Provider: Deepali Menjivar MD          PCP: Kandice Brown MD      Cardiologist: Deepali Menjivar MD                        Interpreting Physician: Max Vaughn MD  _________________________________________________________________________________    Indication:   Detecting the presence and location of coronary artery disease    Clinical History:   Patient has no known history of coronary artery disease. Resting ECG:    Normal sinus rhythm, nonspecific T wave changes, abnormal EKG    Exercise: The patient exercised using a Yefri protocol, completing 7:00 minutes and reaching an estimated work load of 12.6 metabolic equivalents (METS). Resting HR was 57. Peak exercise heart rate was 155 ( 98% of maximum predicted heart rate for age). Baseline /70. Peak exercise /76. The blood pressure response to exercise was normal      Exercise was terminated due to dyspnea and fatigue. The patient experienced no chest pain with exercise. Exercise ECG:   The patient demonstrated occasional PVC's and brief bigeminal PVC's  during exercise. With exercise, there were no ST segment changes of significance at the heart rate achieved. Garza treadmill score was 7 implying low risk. IMAGING: Myocardial perfusion imaging was performed at rest 30-35 minutes following the intravenous injection of 10.7 mCi of (Tc-Sestamibi) followed by 10 ml of Normal Saline. At peak exercise, the patient was injected intravenously with 36.8 mCi of (Tc-Sestamibi) followed by 10 ml of Normal Saline.   Gated post-stress tomographic imaging was performed 20-25 minutes after stress. FINDINGS: The overall quality of the study was excellent. Left ventricular cavity size was noted to be normal.    Rotational analog analysis demonstrated slight horizontal patient motion noted. No abnormal extracardiac radioactivity or attenuation artifact. The gated SPECT stress imaging in the short, vertical long, and horizontal long axis demonstrated normal homogeneous tracer distribution throughout the myocardium. The resting images show no change. Gated SPECT left ventricular ejection fraction was calculated to be 67%, with normal myocardial thickening and wall motion. TID ratio 0.91. Impression:    Exercise EKG was negative. The patient experienced no chest pain with exercise. The myocardial perfusion imaging was normal.    Overall left ventricular systolic function was normal without regional wall motion abnormalities. There is no transient ischemic dilatation. Duke treadmill score was 7 implying low risk. Exercise capacity was above average. There was an appropriate heart rate and blood pressure response to exercise and recovery. Low risk general exercise treadmill test.    Thank you for sending your patient to this [employer].      Electronically signed by Max Vaughn MD on 1/31/23 at 4:03 PM EST

## 2023-02-01 ENCOUNTER — TELEPHONE (OUTPATIENT)
Dept: CARDIOLOGY CLINIC | Age: 63
End: 2023-02-01

## 2023-02-01 NOTE — TELEPHONE ENCOUNTER
Contacted patient with stress test results and recommendations per Dr. Mariann Dozier. She verbalized understanding.    ----- Message from Kate Varma MD sent at 1/31/2023  5:42 PM EST -----  Stress test looks good, normal blood flow to the heart and normal pumping function. No signs of any blockages. Had good exercise capacity. Overall low risk stress test and not suggestive of any underlying obstructive coronary artery disease. Echo pending, will review. Follow-up with primary care and consider GI work-up for chest pain.

## 2023-02-14 ENCOUNTER — TELEPHONE (OUTPATIENT)
Dept: CARDIOLOGY CLINIC | Age: 63
End: 2023-02-14

## 2023-02-14 NOTE — TELEPHONE ENCOUNTER
Left message for patient to contact office. ----- Message from Pb Li MD sent at 2/13/2023  4:17 PM EST -----  Echo showed normal pumping function of the heart. It also shows her aortic valve is trileaflet which is normal, and no other valvular issues. May follow-up in 1 year or as needed.

## 2023-03-02 ENCOUNTER — OFFICE VISIT (OUTPATIENT)
Dept: FAMILY MEDICINE CLINIC | Age: 63
End: 2023-03-02
Payer: COMMERCIAL

## 2023-03-02 VITALS
TEMPERATURE: 97.8 F | OXYGEN SATURATION: 98 % | SYSTOLIC BLOOD PRESSURE: 110 MMHG | WEIGHT: 138 LBS | BODY MASS INDEX: 22.27 KG/M2 | HEART RATE: 86 BPM | DIASTOLIC BLOOD PRESSURE: 70 MMHG

## 2023-03-02 DIAGNOSIS — J01.90 ACUTE SINUSITIS, RECURRENCE NOT SPECIFIED, UNSPECIFIED LOCATION: Primary | ICD-10-CM

## 2023-03-02 DIAGNOSIS — J34.89 SINUS DRAINAGE: ICD-10-CM

## 2023-03-02 DIAGNOSIS — R44.8 FACIAL PRESSURE: ICD-10-CM

## 2023-03-02 PROCEDURE — 99213 OFFICE O/P EST LOW 20 MIN: CPT | Performed by: INTERNAL MEDICINE

## 2023-03-02 RX ORDER — PREDNISONE 10 MG/1
TABLET ORAL
Qty: 12 TABLET | Refills: 0 | Status: SHIPPED | OUTPATIENT
Start: 2023-03-02 | End: 2023-03-08

## 2023-03-02 RX ORDER — AMOXICILLIN AND CLAVULANATE POTASSIUM 875; 125 MG/1; MG/1
1 TABLET, FILM COATED ORAL 2 TIMES DAILY
Qty: 20 TABLET | Refills: 0 | Status: SHIPPED | OUTPATIENT
Start: 2023-03-02 | End: 2023-03-12

## 2023-03-02 ASSESSMENT — ENCOUNTER SYMPTOMS
CHEST TIGHTNESS: 0
COUGH: 0
SHORTNESS OF BREATH: 0
SORE THROAT: 1
WHEEZING: 0
SINUS PRESSURE: 1
EYES NEGATIVE: 1

## 2023-03-02 NOTE — PROGRESS NOTES
[address] [place]     3/2/23  Christi Guzman : 1960 Sex: female  Age: 61 y.o. Chief Complaint   Patient presents with    Sinus Problem     Drainage-sore throat       HPI    Patient presents to express care this morning complaining of sinus drainage, sore throat, headache, facial pressure, fever up to 101 with symptoms starting 4 days ago. States she is getting green mucus up. States he gets these symptoms yearly. Denies chills. Denies shortness of breath or cough. Denies recent exposure. She is vaccinated to [employer]. Review of Systems   Constitutional:  Positive for fever. Negative for chills. HENT:  Positive for congestion, postnasal drip, sinus pressure and sore throat. Negative for ear pain. Eyes: Negative. Respiratory:  Negative for cough, chest tightness, shortness of breath and wheezing. Cardiovascular:  Negative for chest pain. Neurological:  Positive for headaches. REST OF PERTINENT ROS GONE OVER AND WAS NEGATIVE. Current Outpatient Medications:     amoxicillin-clavulanate (AUGMENTIN) 875-125 MG per tablet, Take 1 tablet by mouth 2 times daily for 10 days, Disp: 20 tablet, Rfl: 0    predniSONE (DELTASONE) 10 MG tablet, Take 3 tablets by mouth daily for 2 days, THEN 2 tablets daily for 2 days, THEN 1 tablet daily for 2 days. , Disp: 12 tablet, Rfl: 0    Simethicone (GAS-X PO), Take by mouth daily, Disp: , Rfl:   Allergies   Allergen Reactions    Mixed Feathers Other (See Comments)       Past Medical History:   Diagnosis Date    Cervical cancer ([facility] [address] 2018    [address]    Inflammatory arthritis     [facility]- Rheumatology     Past Surgical History:   Procedure Laterality Date    BREAST CYST EXCISION Left 2009    CHOLECYSTECTOMY, LAPAROSCOPIC N/A 2013    [place]    [facility]     Family History   Problem Relation Age of Onset    Other Mother         scleroderma    Other Father         valvular heart disease-endocarditis    Coronary Art Dis Brother         MI age 43- valve replacement-ICD     Social History     Socioeconomic History    Marital status:      Spouse name: Not on file    Number of children: 3    Years of education: Not on file    Highest education level: Not on file   Occupational History    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 Exercise is minimal but does housework on a daily basis. Social Determinants of Health     Financial Resource Strain: Not on file   Food Insecurity: Not on file   Transportation Needs: Not on file   Physical Activity: Not on file   Stress: Not on file   Social Connections: Not on file   Intimate Partner Violence: Not on file   Housing Stability: Not on file       Vitals:    03/02/23 0758   BP: 110/70   Pulse: 86   Temp: 97.8 °F (36.6 °C)   SpO2: 98%   Weight: 138 lb (62.6 kg)       Physical Exam  Vitals and nursing note reviewed. Constitutional:       General: She is not in acute distress. Appearance: She is well-developed. HENT:      Head: Normocephalic and atraumatic. Right Ear: Tympanic membrane, ear canal and external ear normal.      Left Ear: Tympanic membrane, ear canal and external ear normal.      Nose: Nose normal.      Comments: Bimaxillary sinus tenderness to palpation. Mouth/Throat:      Mouth: Mucous membranes are moist.      Pharynx: Oropharynx is clear. Posterior oropharyngeal erythema present. No oropharyngeal exudate.       Comments: Colored mucus draining posterior pharynx  Cardiovascular:      Rate and Rhythm: Normal rate and regular rhythm. Heart sounds: Normal heart sounds. No murmur heard. Pulmonary:      Effort: Pulmonary effort is normal. No respiratory distress. Breath sounds: Normal breath sounds. No wheezing, rhonchi or rales. Musculoskeletal:      Cervical back: Normal range of motion and neck supple. No tenderness. Lymphadenopathy:      Cervical: No cervical adenopathy. Skin:     General: Skin is warm and dry. Neurological:      Mental Status: She is alert and oriented to person, place, and time. Psychiatric:         Mood and Affect: Mood normal.         Behavior: Behavior normal.         Thought Content: Thought content normal.         Judgment: Judgment normal.                 Assessment and Plan:  Baldev Hernandez was seen today for sinus problem. Diagnoses and all orders for this visit:    Acute sinusitis, recurrence not specified, unspecified location    Sinus drainage    Facial pressure    Other orders  -     amoxicillin-clavulanate (AUGMENTIN) 875-125 MG per tablet; Take 1 tablet by mouth 2 times daily for 10 days  -     predniSONE (DELTASONE) 10 MG tablet; Take 3 tablets by mouth daily for 2 days, THEN 2 tablets daily for 2 days, THEN 1 tablet daily for 2 days. Plan: We will treat sinusitis with Augmentin and prednisone taper dose. Warned of potential side effects. Probiotic. Push fluids. Follow-up with PCP. Notify us if not improving. Return for fu pcp. Seen By:  Melissa Dolan MD      *Document was created using voice recognition software. Note was reviewed however may contain grammatical errors.

## 2023-04-03 ENCOUNTER — OFFICE VISIT (OUTPATIENT)
Dept: FAMILY MEDICINE CLINIC | Age: 63
End: 2023-04-03
Payer: COMMERCIAL

## 2023-04-03 VITALS
HEART RATE: 85 BPM | HEIGHT: 66 IN | TEMPERATURE: 98.2 F | WEIGHT: 138 LBS | DIASTOLIC BLOOD PRESSURE: 80 MMHG | SYSTOLIC BLOOD PRESSURE: 128 MMHG | OXYGEN SATURATION: 98 % | BODY MASS INDEX: 22.18 KG/M2 | RESPIRATION RATE: 16 BRPM

## 2023-04-03 DIAGNOSIS — B96.89 ACUTE BACTERIAL SINUSITIS: Primary | ICD-10-CM

## 2023-04-03 DIAGNOSIS — J01.90 ACUTE BACTERIAL SINUSITIS: Primary | ICD-10-CM

## 2023-04-03 PROCEDURE — 99213 OFFICE O/P EST LOW 20 MIN: CPT | Performed by: STUDENT IN AN ORGANIZED HEALTH CARE EDUCATION/TRAINING PROGRAM

## 2023-04-03 RX ORDER — IPRATROPIUM BROMIDE 21 UG/1
2 SPRAY, METERED NASAL EVERY 12 HOURS
Qty: 30 ML | Refills: 0 | Status: SHIPPED | OUTPATIENT
Start: 2023-04-03

## 2023-04-03 RX ORDER — DOXYCYCLINE HYCLATE 100 MG
100 TABLET ORAL 2 TIMES DAILY
Qty: 20 TABLET | Refills: 0 | Status: SHIPPED | OUTPATIENT
Start: 2023-04-03 | End: 2023-04-13

## 2023-04-03 ASSESSMENT — PATIENT HEALTH QUESTIONNAIRE - PHQ9
SUM OF ALL RESPONSES TO PHQ QUESTIONS 1-9: 0
SUM OF ALL RESPONSES TO PHQ QUESTIONS 1-9: 0
1. LITTLE INTEREST OR PLEASURE IN DOING THINGS: 0
SUM OF ALL RESPONSES TO PHQ QUESTIONS 1-9: 0
SUM OF ALL RESPONSES TO PHQ QUESTIONS 1-9: 0
2. FEELING DOWN, DEPRESSED OR HOPELESS: 0
SUM OF ALL RESPONSES TO PHQ9 QUESTIONS 1 & 2: 0

## 2023-04-03 ASSESSMENT — ENCOUNTER SYMPTOMS
SINUS PRESSURE: 1
RHINORRHEA: 1
VOMITING: 0
ABDOMINAL PAIN: 0
COUGH: 1
SHORTNESS OF BREATH: 0
NAUSEA: 0

## 2023-04-03 NOTE — PROGRESS NOTES
Kishore Garcia (:  1960) is a 61 y.o. female,Established patient, here for evaluation of the following chief complaint(s):  Sinusitis (Started saturday) and Headache         ASSESSMENT/PLAN:  1. Acute bacterial sinusitis  -     ipratropium (ATROVENT) 0.03 % nasal spray; 2 sprays by Each Nostril route in the morning and 2 sprays in the evening., Disp-30 mL, R-0Normal  -     doxycycline hyclate (VIBRA-TABS) 100 MG tablet; Take 1 tablet by mouth 2 times daily for 10 days, Disp-20 tablet, R-0Normal    Will try doxy. Atrovent for symptomatic relief. Ears look borderline as well. Encouraged hydration. Discussed return and ER precautions. Patient and or parent verbalized understanding    Return if symptoms worsen or fail to improve. Subjective   SUBJECTIVE/OBJECTIVE:  Sneezing and nose running starting a few days ago  Had some cough  Now having pressure under R eye and in the R ear  She took an allergy pill this morning. Took allegra and tylneol  No fever      Review of Systems   Constitutional:  Negative for chills and fever. HENT:  Positive for congestion, ear pain, rhinorrhea and sinus pressure. Respiratory:  Positive for cough. Negative for shortness of breath. Cardiovascular:  Negative for chest pain and leg swelling. Gastrointestinal:  Negative for abdominal pain, nausea and vomiting. Genitourinary:  Negative for dysuria and hematuria. Musculoskeletal:  Negative for arthralgias and myalgias. Skin:  Negative for rash and wound. Neurological:  Negative for dizziness and light-headedness. Objective   Physical Exam  Vitals reviewed. Constitutional:       General: She is not in acute distress. HENT:      Head: Normocephalic and atraumatic. Right Ear: Tympanic membrane is erythematous. Left Ear: Tympanic membrane is erythematous. Mouth/Throat:      Pharynx: Posterior oropharyngeal erythema present.    Eyes:      Extraocular Movements: Extraocular movements

## 2023-05-09 ENCOUNTER — TELEPHONE (OUTPATIENT)
Dept: FAMILY MEDICINE CLINIC | Age: 63
End: 2023-05-09

## 2023-05-09 ENCOUNTER — OFFICE VISIT (OUTPATIENT)
Dept: FAMILY MEDICINE CLINIC | Age: 63
End: 2023-05-09
Payer: COMMERCIAL

## 2023-05-09 VITALS
OXYGEN SATURATION: 99 % | RESPIRATION RATE: 16 BRPM | DIASTOLIC BLOOD PRESSURE: 72 MMHG | TEMPERATURE: 97.2 F | BODY MASS INDEX: 22.82 KG/M2 | WEIGHT: 142 LBS | SYSTOLIC BLOOD PRESSURE: 110 MMHG | HEART RATE: 61 BPM | HEIGHT: 66 IN

## 2023-05-09 DIAGNOSIS — S20.211A CONTUSION OF RIBS, RIGHT, INITIAL ENCOUNTER: Primary | ICD-10-CM

## 2023-05-09 DIAGNOSIS — S22.31XA CLOSED FRACTURE OF ONE RIB OF RIGHT SIDE, INITIAL ENCOUNTER: ICD-10-CM

## 2023-05-09 PROCEDURE — 99214 OFFICE O/P EST MOD 30 MIN: CPT | Performed by: FAMILY MEDICINE

## 2023-05-09 RX ORDER — TRAMADOL HYDROCHLORIDE 50 MG/1
50 TABLET ORAL EVERY 6 HOURS PRN
Qty: 20 TABLET | Refills: 0 | Status: SHIPPED | OUTPATIENT
Start: 2023-05-09 | End: 2023-05-14

## 2023-05-09 ASSESSMENT — ENCOUNTER SYMPTOMS
EYE DISCHARGE: 0
COUGH: 0
EYE PAIN: 0
VOMITING: 0
BLOOD IN STOOL: 0
ABDOMINAL PAIN: 0
BACK PAIN: 0
CHEST TIGHTNESS: 0
SHORTNESS OF BREATH: 0
DIARRHEA: 0
SINUS PAIN: 0
NAUSEA: 0
ALLERGIC/IMMUNOLOGIC NEGATIVE: 1
SORE THROAT: 0
EYE REDNESS: 0
TROUBLE SWALLOWING: 0
PHOTOPHOBIA: 0

## 2023-05-09 NOTE — TELEPHONE ENCOUNTER
Patient came to my check out window. She was seen in express care today by Dr. Neo Reddy. She has at least one cracked rib. Dr. Neo Reddy wants her to follow up with you. Please advise where I can schedule her.

## 2023-05-09 NOTE — PROGRESS NOTES
23  Mercedes Espinal : 1960 Sex: female  Age: 61 y.o. Assessment and Plan:  Christine Hamm was seen today for flank pain. Diagnoses and all orders for this visit:    Contusion of ribs, right, initial encounter  -     XR RIBS RIGHT INCLUDE CHEST (MIN 3 VIEWS); Future  -     traMADol (ULTRAM) 50 MG tablet; Take 1 tablet by mouth every 6 hours as needed for Pain for up to 5 days. Intended supply: 7 days. Take lowest dose possible to manage pain Max Daily Amount: 200 mg    Closed fracture of one rib of right side, initial encounter  -     traMADol (ULTRAM) 50 MG tablet; Take 1 tablet by mouth every 6 hours as needed for Pain for up to 5 days. Intended supply: 7 days. Take lowest dose possible to manage pain Max Daily Amount: 200 mg    Patient has a fractured right seventh rib by my reading. Final disposition per radiology report. We will go ahead and treat her pain with a short course of tramadol. OARRS was reviewed. He can continue to use once comfortable at home ice or heat. She should rest.  Should her complaints not improve, or worsen in any way, present back to the office. To follow-up with Dr. Damir Souza in 7 days    Return in about 1 week (around 2023). Chief Complaint   Patient presents with    Flank Pain     Right, after tilling incident on Saturday        Complains of pain in the right lower rib cage. She was rototilling the garden on Saturday when the tiller kicked up and struck her in the ribs area. It was immediately painful and dropped her to her knees. She continues with point tenderness to that area and pain on cough sneeze or range of motion. He denies any redness, swelling, ecchymosis to the area. Review of Systems   Constitutional:  Negative for appetite change, fatigue and unexpected weight change. HENT:  Negative for congestion, ear pain, hearing loss, sinus pain, sore throat and trouble swallowing. Eyes:  Negative for photophobia, pain, discharge and redness.

## 2023-05-16 ENCOUNTER — OFFICE VISIT (OUTPATIENT)
Dept: FAMILY MEDICINE CLINIC | Age: 63
End: 2023-05-16
Payer: COMMERCIAL

## 2023-05-16 VITALS
WEIGHT: 140 LBS | OXYGEN SATURATION: 97 % | BODY MASS INDEX: 22.6 KG/M2 | DIASTOLIC BLOOD PRESSURE: 70 MMHG | TEMPERATURE: 97.3 F | HEART RATE: 75 BPM | SYSTOLIC BLOOD PRESSURE: 110 MMHG

## 2023-05-16 DIAGNOSIS — S22.31XD CLOSED FRACTURE OF ONE RIB OF RIGHT SIDE WITH ROUTINE HEALING, SUBSEQUENT ENCOUNTER: Primary | ICD-10-CM

## 2023-05-16 PROCEDURE — 99213 OFFICE O/P EST LOW 20 MIN: CPT | Performed by: INTERNAL MEDICINE

## 2023-05-16 RX ORDER — OXYCODONE AND ACETAMINOPHEN 7.5; 325 MG/1; MG/1
1 TABLET ORAL NIGHTLY PRN
Qty: 14 TABLET | Refills: 0 | Status: SHIPPED | OUTPATIENT
Start: 2023-05-16 | End: 2023-05-30

## 2023-05-16 ASSESSMENT — ENCOUNTER SYMPTOMS
RESPIRATORY NEGATIVE: 1
ALLERGIC/IMMUNOLOGIC NEGATIVE: 1
EYES NEGATIVE: 1

## 2023-05-16 NOTE — PROGRESS NOTES
2023    Bo Templeton (:  1960) is a 61 y.o. female, here for evaluation of the following medical concerns:    Patient was using a Rototiller when it hit a rock and popped back. The handle struck the middle of her chest and the right side of her chest.  She did [facility] Amanda [facility] in [facility] and did have 1/7 rib fracture on the right-hand side. This was nondisplaced. Patient was given tramadol for discomfort. She is really not very comfortable especially at night and is unable to sleep. Patient was able to tolerate more potent pain medications in the past and I will give her some Percocet to sleep. I also asked her to take Aleve during the day twice per day to see if this would give her some relief. She is instructed to take deep breaths every hour while awake to make sure she does not develop atelectatic changes. Hypertension    Other    Dizziness        Review of Systems   Constitutional: Negative. HENT: Negative. Eyes: Negative. Respiratory: Negative. Cardiovascular: Negative. Gastrointestinal:         Symptoms of GERD. Denies danger symptoms including odynophagia, dysphagia, early satiety. Previous EGD by Dr. Milagros Silva. Endocrine: Negative. Genitourinary: Negative. Musculoskeletal:         Recent right seventh rib fracture   Skin: Negative. Allergic/Immunologic: Negative. Neurological:  Positive for dizziness. Hematological: Negative. Psychiatric/Behavioral: Negative. Prior to Visit Medications    Medication Sig Taking? Authorizing Provider   oxyCODONE-acetaminophen (PERCOCET) 7.5-325 MG per tablet Take 1 tablet by mouth nightly as needed for Pain for up to 14 days.  Max Daily Amount: 1 tablet Yes Jose Eduardo Calvo MD        Allergies   Allergen Reactions    Mixed Feathers Other (See Comments)       Past Medical History:   Diagnosis Date    Cervical cancer ([facility] [address] 2018    [facility]    Inflammatory arthritis 2008    [facility]- Rheumatology       Past

## 2023-06-19 ENCOUNTER — OFFICE VISIT (OUTPATIENT)
Dept: RHEUMATOLOGY | Age: 63
End: 2023-06-19
Payer: COMMERCIAL

## 2023-06-19 VITALS — WEIGHT: 141 LBS | BODY MASS INDEX: 22.66 KG/M2 | HEIGHT: 66 IN

## 2023-06-19 DIAGNOSIS — M06.4 UNDIFFERENTIATED INFLAMMATORY ARTHRITIS (HCC): Primary | ICD-10-CM

## 2023-06-19 DIAGNOSIS — R76.8 POSITIVE ANA (ANTINUCLEAR ANTIBODY): ICD-10-CM

## 2023-06-19 DIAGNOSIS — M15.9 GENERALIZED OSTEOARTHRITIS: ICD-10-CM

## 2023-06-19 DIAGNOSIS — M35.01 SJOGREN'S SYNDROME WITH KERATOCONJUNCTIVITIS SICCA (HCC): ICD-10-CM

## 2023-06-19 PROCEDURE — 99205 OFFICE O/P NEW HI 60 MIN: CPT | Performed by: INTERNAL MEDICINE

## 2023-06-19 RX ORDER — CELECOXIB 200 MG/1
200 CAPSULE ORAL 2 TIMES DAILY PRN
Qty: 60 CAPSULE | Refills: 3 | Status: SHIPPED | OUTPATIENT
Start: 2023-06-19

## 2023-06-19 ASSESSMENT — ENCOUNTER SYMPTOMS
VOMITING: 0
NAUSEA: 0
SHORTNESS OF BREATH: 0
DIARRHEA: 0
TROUBLE SWALLOWING: 0
ABDOMINAL PAIN: 0
COLOR CHANGE: 0
COUGH: 0

## 2023-06-19 NOTE — PROGRESS NOTES
Dereck Rodriguez 1960 is a 61 y.o. female, here for evaluation of the following chief complaint(s):  New Patient (Patient here as a new patient for inflammatory arthritis. )         ASSESSMENT/PLAN:    Dereck Rodriguez 1960 is a 61 y.o. female seen in consult for inflammatory arthritis. 1.  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 She has failed all the traditional DMARDs including Plaquenil, methotrexate, Arava, sulfasalazine. At this point we did discuss that the next step would be Biologics, but will hold off for right now. We will need to get further work-up as below to get a better characterization of her disease. However, we did discuss that if there is no joint damage to this point it is a decision we will make together as to how aggressive we want to be. 2.  Sjogren's syndrome-she has features of Sjogren's syndrome with positive SHAVON, positive SSA and SSB. She does have some mild sicca symptoms but apparently negative Schirmer's test.  Treatment of sicca symptoms is symptomatic. Again her inflammatory arthritis could potentially be related to Sjogren's as well but she did not respond to traditional DMARDs. 3.  Positive SHAVON-clinically I see no signs to suggest an underlying systemic connective tissue disease but will need further work-up as below. Her mother had scleroderma but she has no sclerodactyly or Raynaud's. 4.  Osteoarthritis-she has some underlying osteoarthritis regardless. We will try her on Celebrex 200 mg twice daily as needed with food. 1. Undifferentiated inflammatory arthritis ([facility] [address]  -     SHAVON; Future  -     C3 Complement; Future  -     C4 Complement; Future  -     CBC with Auto Differential; Future  -     Comprehensive Metabolic Panel; Future  -     Sjogren's Ab (SS-A, SS-B);  Future  -

## 2023-06-21 ENCOUNTER — HOSPITAL ENCOUNTER (OUTPATIENT)
Age: 63
Discharge: HOME | End: 2023-06-21
Payer: COMMERCIAL

## 2023-06-21 DIAGNOSIS — M35.01 SJOGREN'S SYNDROME WITH KERATOCONJUNCTIVITIS SICCA (HCC): ICD-10-CM

## 2023-06-21 DIAGNOSIS — M06.4 UNDIFFERENTIATED INFLAMMATORY ARTHRITIS (HCC): ICD-10-CM

## 2023-06-21 DIAGNOSIS — R76.8 POSITIVE ANA (ANTINUCLEAR ANTIBODY): ICD-10-CM

## 2023-06-21 LAB
ALBUMIN SERPL-MCNC: 4.8 G/DL (ref 3.5–5.2)
ALP SERPL-CCNC: 78 U/L (ref 35–104)
ALT SERPL-CCNC: 17 U/L (ref 0–32)
ANION GAP SERPL CALCULATED.3IONS-SCNC: 11 MMOL/L (ref 7–16)
AST SERPL-CCNC: 22 U/L (ref 0–31)
BACTERIA URNS QL MICRO: NORMAL /HPF
BASOPHILS # BLD: 0.02 E9/L (ref 0–0.2)
BASOPHILS NFR BLD: 0.5 % (ref 0–2)
BILIRUB SERPL-MCNC: 0.5 MG/DL (ref 0–1.2)
BILIRUB UR QL STRIP: NEGATIVE
BUN SERPL-MCNC: 13 MG/DL (ref 6–23)
C3 SERPL-MCNC: 115 MG/DL (ref 90–180)
C4 SERPL-MCNC: 26 MG/DL (ref 10–40)
CALCIUM SERPL-MCNC: 10 MG/DL (ref 8.6–10.2)
CHLORIDE SERPL-SCNC: 104 MMOL/L (ref 98–107)
CLARITY UR: CLEAR
CO2 SERPL-SCNC: 27 MMOL/L (ref 22–29)
COLOR UR: YELLOW
CREAT SERPL-MCNC: 0.9 MG/DL (ref 0.5–1)
CRP SERPL HS-MCNC: <0.3 MG/DL (ref 0–0.4)
EOSINOPHIL # BLD: 0.14 E9/L (ref 0.05–0.5)
EOSINOPHIL NFR BLD: 3.6 % (ref 0–6)
ERYTHROCYTE [DISTWIDTH] IN BLOOD BY AUTOMATED COUNT: 12.2 FL (ref 11.5–15)
ERYTHROCYTE [SEDIMENTATION RATE] IN BLOOD BY WESTERGREN METHOD: 25 MM/HR (ref 0–20)
GLUCOSE SERPL-MCNC: 94 MG/DL (ref 74–99)
GLUCOSE UR STRIP-MCNC: NEGATIVE MG/DL
HCT VFR BLD AUTO: 41.4 % (ref 34–48)
HGB BLD-MCNC: 13.4 G/DL (ref 11.5–15.5)
HGB UR QL STRIP: NEGATIVE
IMM GRANULOCYTES # BLD: 0.01 E9/L
IMM GRANULOCYTES NFR BLD: 0.3 % (ref 0–5)
KETONES UR STRIP-MCNC: NEGATIVE MG/DL
LEUKOCYTE ESTERASE UR QL STRIP: ABNORMAL
LYMPHOCYTES # BLD: 1.37 E9/L (ref 1.5–4)
LYMPHOCYTES NFR BLD: 35.1 % (ref 20–42)
MCH RBC QN AUTO: 30.6 PG (ref 26–35)
MCHC RBC AUTO-ENTMCNC: 32.4 % (ref 32–34.5)
MCV RBC AUTO: 94.5 FL (ref 80–99.9)
MONOCYTES # BLD: 0.38 E9/L (ref 0.1–0.95)
MONOCYTES NFR BLD: 9.7 % (ref 2–12)
NEUTROPHILS # BLD: 1.98 E9/L (ref 1.8–7.3)
NEUTS SEG NFR BLD: 50.8 % (ref 43–80)
NITRITE UR QL STRIP: NEGATIVE
PH UR STRIP: 7.5 [PH] (ref 5–9)
PLATELET # BLD AUTO: 283 E9/L (ref 130–450)
PMV BLD AUTO: 10.6 FL (ref 7–12)
POTASSIUM SERPL-SCNC: 4.1 MMOL/L (ref 3.5–5)
PROT SERPL-MCNC: 7.8 G/DL (ref 6.4–8.3)
PROT UR STRIP-MCNC: NEGATIVE MG/DL
RBC # BLD AUTO: 4.38 E12/L (ref 3.5–5.5)
RBC #/AREA URNS HPF: NORMAL /HPF (ref 0–2)
RHEUMATOID FACT SER NEPH-ACNC: 10 IU/ML (ref 0–13)
SODIUM SERPL-SCNC: 142 MMOL/L (ref 132–146)
SP GR UR STRIP: 1.01 (ref 1–1.03)
UROBILINOGEN UR STRIP-ACNC: 0.2 E.U./DL
WBC # BLD: 3.9 E9/L (ref 4.5–11.5)
WBC #/AREA URNS HPF: NORMAL /HPF (ref 0–5)

## 2023-06-21 PROCEDURE — 86803 HEPATITIS C AB TEST: CPT

## 2023-06-21 PROCEDURE — 86704 HEP B CORE ANTIBODY TOTAL: CPT

## 2023-06-21 PROCEDURE — 80053 COMPREHEN METABOLIC PANEL: CPT

## 2023-06-21 PROCEDURE — 86160 COMPLEMENT ANTIGEN: CPT

## 2023-06-21 PROCEDURE — 83516 IMMUNOASSAY NONANTIBODY: CPT

## 2023-06-21 PROCEDURE — 86706 HEP B SURFACE ANTIBODY: CPT

## 2023-06-21 PROCEDURE — 87340 HEPATITIS B SURFACE AG IA: CPT

## 2023-06-21 PROCEDURE — 86039 ANTINUCLEAR ANTIBODIES (ANA): CPT

## 2023-06-21 PROCEDURE — 86225 DNA ANTIBODY NATIVE: CPT

## 2023-06-21 PROCEDURE — 81001 URINALYSIS AUTO W/SCOPE: CPT

## 2023-06-21 PROCEDURE — 86235 NUCLEAR ANTIGEN ANTIBODY: CPT

## 2023-06-21 PROCEDURE — 36415 COLL VENOUS BLD VENIPUNCTURE: CPT

## 2023-06-21 PROCEDURE — 86038 ANTINUCLEAR ANTIBODIES: CPT

## 2023-06-21 PROCEDURE — 86481 TB AG RESPONSE T-CELL SUSP: CPT

## 2023-06-21 PROCEDURE — 85025 COMPLETE CBC W/AUTO DIFF WBC: CPT

## 2023-06-21 PROCEDURE — 85651 RBC SED RATE NONAUTOMATED: CPT

## 2023-06-21 PROCEDURE — 86140 C-REACTIVE PROTEIN: CPT

## 2023-06-21 PROCEDURE — 86200 CCP ANTIBODY: CPT

## 2023-06-21 PROCEDURE — 83520 IMMUNOASSAY QUANT NOS NONAB: CPT

## 2023-06-21 PROCEDURE — 86431 RHEUMATOID FACTOR QUANT: CPT

## 2023-06-22 LAB
ANA PAT SER-IMP: ABNORMAL
ANA SER QL: POSITIVE
ANA TITR SER: 1.32 {TITER}
ANTI DNA DOUBLE STRANDED: NEGATIVE
ENA TO RNP ANTIBODY: NEGATIVE
ENA TO SMITH (SM) ANTIBODY: NEGATIVE
ENA TO SSA (RO) ANTIBODY: POSITIVE
ENA TO SSB (LA) ANTIBODY: NEGATIVE
HBV CORE AB SER QL: NONREACTIVE
HBV SURFACE AB SERPL IA-ACNC: NORMAL M[IU]/ML
HBV SURFACE AG SERPL QL IA: NORMAL
HCV AB SERPL QL IA: NORMAL
Lab: NORMAL
Lab: NORMAL
SCLERODERMA (SCL-70) AB: NEGATIVE
THIS TEST SENT TO: NORMAL
THIS TEST SENT TO: NORMAL

## 2023-06-25 LAB
COMMENT: NORMAL
Lab: NORMAL
REPORT: NORMAL
REPORT: NORMAL
THIS TEST SENT TO: NORMAL

## 2023-06-27 LAB — CCP AB SER IA-ACNC: 0.9 U/ML (ref 0–7)

## 2023-06-28 LAB
Lab: NORMAL
REPORT: NORMAL
THIS TEST SENT TO: NORMAL

## 2023-07-05 LAB
Lab: NORMAL
REPORT: NORMAL
THIS TEST SENT TO: NORMAL

## 2023-09-28 ENCOUNTER — OFFICE VISIT (OUTPATIENT)
Dept: RHEUMATOLOGY | Age: 63
End: 2023-09-28
Payer: COMMERCIAL

## 2023-09-28 VITALS — HEIGHT: 66 IN | WEIGHT: 141 LBS | BODY MASS INDEX: 22.66 KG/M2

## 2023-09-28 DIAGNOSIS — M35.01 SJOGREN'S SYNDROME WITH KERATOCONJUNCTIVITIS SICCA (HCC): ICD-10-CM

## 2023-09-28 DIAGNOSIS — M15.9 GENERALIZED OSTEOARTHRITIS: ICD-10-CM

## 2023-09-28 DIAGNOSIS — M06.4 UNDIFFERENTIATED INFLAMMATORY ARTHRITIS (HCC): Primary | ICD-10-CM

## 2023-09-28 PROCEDURE — 99214 OFFICE O/P EST MOD 30 MIN: CPT | Performed by: INTERNAL MEDICINE

## 2023-09-28 ASSESSMENT — ENCOUNTER SYMPTOMS
VOMITING: 0
SHORTNESS OF BREATH: 0
COLOR CHANGE: 0
NAUSEA: 0
ABDOMINAL PAIN: 0
TROUBLE SWALLOWING: 0
DIARRHEA: 0
COUGH: 0

## 2023-09-28 NOTE — PROGRESS NOTES
RESOLUTION; Future  3. Generalized osteoarthritis      Return in about 6 months (around 3/28/2024). Subjective   SUBJECTIVE/OBJECTIVE:    HPI: Christine Awad 1960 is a 61 y.o. female seen in follow-up for inflammatory arthritis. Last visit work-up showed positive SHAVON in an SSA pattern, positive SSA and weak positive RNA polymerase 3 antibody. We put her on Celebrex. She still has some arthralgias but it does help. She has not had skin thickening or Raynaud's. She has no shortness of breath or cough. Initial history: Patient was diagnosed several years ago and was following with rheumatology at the [facility] clinic. She was found to have positive SHAVON, positive SSA and SSB with negative rheumatoid factor and CCP. An MRI of the right hand which did show some slight enhancement in multiple flexor tendons in several MCP joints back in . She has failed multiple medications in the past including Plaquenil, methotrexate, Arava, sulfasalazine. She is currently just taking ibuprofen on a as needed basis. She continues to have pain in the hands. More recently she has had some pain in the feet as well. She feels like she does get some swelling. Her pain is worse in the morning and with heat. She is stiff for about 1/2-hour in the morning. She cannot recall if prednisone helped in the past but did not tolerate it well. She states that it gave her hot flashes. She has dry mouth but has a history of salivary gland cyst removal.  She states that she does not really have a lot of issues with dry eyes and had a Schirmer's test which was negative in the past.  Her mother  from scleroderma. She has not noticed any sclerodactyly or Raynaud's. Past Medical History:   Diagnosis Date    Cervical cancer ([address]) 2018    [facility]    Inflammatory arthritis     [facility]- Rheumatology        Review of Systems   Constitutional:  Negative for fatigue and fever.    HENT:  Negative for mouth sores

## 2023-10-31 ENCOUNTER — HOSPITAL ENCOUNTER (OUTPATIENT)
Age: 63
Discharge: HOME | End: 2023-10-31
Payer: COMMERCIAL

## 2023-10-31 ENCOUNTER — HOSPITAL ENCOUNTER (OUTPATIENT)
Dept: CT IMAGING | Age: 63
Discharge: HOME | End: 2023-11-02
Payer: COMMERCIAL

## 2023-10-31 DIAGNOSIS — M06.4 UNDIFFERENTIATED INFLAMMATORY ARTHRITIS (HCC): ICD-10-CM

## 2023-10-31 DIAGNOSIS — M35.01 SJOGREN'S SYNDROME WITH KERATOCONJUNCTIVITIS SICCA (HCC): ICD-10-CM

## 2023-10-31 PROCEDURE — 36415 COLL VENOUS BLD VENIPUNCTURE: CPT

## 2023-10-31 PROCEDURE — 71250 CT THORAX DX C-: CPT

## 2023-11-02 DIAGNOSIS — M35.01 SJOGREN'S SYNDROME WITH KERATOCONJUNCTIVITIS SICCA (HCC): ICD-10-CM

## 2023-11-02 DIAGNOSIS — R93.89 ABNORMAL CT OF THE CHEST: ICD-10-CM

## 2023-11-02 DIAGNOSIS — M06.4 UNDIFFERENTIATED INFLAMMATORY ARTHRITIS (HCC): Primary | ICD-10-CM

## 2023-11-09 ENCOUNTER — OFFICE VISIT (OUTPATIENT)
Dept: FAMILY MEDICINE CLINIC | Age: 63
End: 2023-11-09
Payer: COMMERCIAL

## 2023-11-09 VITALS
BODY MASS INDEX: 26.36 KG/M2 | SYSTOLIC BLOOD PRESSURE: 116 MMHG | HEIGHT: 66 IN | HEART RATE: 73 BPM | RESPIRATION RATE: 18 BRPM | TEMPERATURE: 97.8 F | DIASTOLIC BLOOD PRESSURE: 72 MMHG | WEIGHT: 164 LBS | OXYGEN SATURATION: 100 %

## 2023-11-09 DIAGNOSIS — J01.40 ACUTE NON-RECURRENT PANSINUSITIS: ICD-10-CM

## 2023-11-09 DIAGNOSIS — N30.01 ACUTE CYSTITIS WITH HEMATURIA: Primary | ICD-10-CM

## 2023-11-09 DIAGNOSIS — H10.33 ACUTE BACTERIAL CONJUNCTIVITIS OF BOTH EYES: ICD-10-CM

## 2023-11-09 LAB
BILIRUBIN, POC: NEGATIVE
BLOOD URINE, POC: NORMAL
CLARITY, POC: NORMAL
COLOR, POC: YELLOW
GLUCOSE URINE, POC: NEGATIVE
KETONES, POC: NEGATIVE
LEUKOCYTE EST, POC: NORMAL
NITRITE, POC: NEGATIVE
PH, POC: 7
PROTEIN, POC: NEGATIVE
SPECIFIC GRAVITY, POC: 1.01
UROBILINOGEN, POC: 0.2

## 2023-11-09 PROCEDURE — 81002 URINALYSIS NONAUTO W/O SCOPE: CPT | Performed by: PHYSICIAN ASSISTANT

## 2023-11-09 PROCEDURE — 99214 OFFICE O/P EST MOD 30 MIN: CPT | Performed by: PHYSICIAN ASSISTANT

## 2023-11-09 RX ORDER — POLYMYXIN B SULFATE AND TRIMETHOPRIM 1; 10000 MG/ML; [USP'U]/ML
SOLUTION OPHTHALMIC
Qty: 3 ML | Refills: 0 | Status: SHIPPED | OUTPATIENT
Start: 2023-11-09

## 2023-11-09 RX ORDER — CEFDINIR 300 MG/1
300 CAPSULE ORAL 2 TIMES DAILY
Qty: 20 CAPSULE | Refills: 0 | Status: SHIPPED | OUTPATIENT
Start: 2023-11-09 | End: 2023-11-19

## 2023-11-09 NOTE — PROGRESS NOTES
rash.  Lymphatic: No lymphangitis or adenopathy noted. Neurological:  Oriented. Motor functions intact. Lab / Imaging Results   (All laboratory and radiology results have been personally reviewed by myself)  Labs:  Results for orders placed or performed in visit on 11/09/23   POCT Urinalysis no Micro   Result Value Ref Range    Color, UA yellow     Clarity, UA cloudy     Glucose, UA POC negative     Bilirubin, UA negative     Ketones, UA negative     Spec Grav, UA 1.015     Blood, UA POC moderate     pH, UA 7.0     Protein, UA POC negative     Urobilinogen, UA 0.2     Leukocytes, UA moderate     Nitrite, UA negative        Imaging: All Radiology results interpreted by Radiologist unless otherwise noted. Assessment / Plan     Impression(s):  Chen Mata was seen today for congestion and urinary tract infection. Diagnoses and all orders for this visit:    Acute cystitis with hematuria  -     POCT Urinalysis no Micro  -     cefdinir (OMNICEF) 300 MG capsule; Take 1 capsule by mouth 2 times daily for 10 days  -     Culture, Urine; Future    Acute non-recurrent pansinusitis  -     cefdinir (OMNICEF) 300 MG capsule; Take 1 capsule by mouth 2 times daily for 10 days    Acute bacterial conjunctivitis of both eyes  -     trimethoprim-polymyxin b (POLYTRIM) 77343-5.1 UNIT/ML-% ophthalmic solution; 1 drop bilateral eyes QID x 7 days      Disposition:  Disposition: Discharge to home. UA appears positive for a UTI. Urine C&S pending, will call with results once available. Pt also has evidence of developing sinusitis/conjunctivitis. Scripts written for [employer], side effects and application directions discussed. Increase fluids and rest. Advised good handwashing, avoiding rubbing eyes, and washing towels and pillowcase in hot water to prevent spread. F/u with ophthalmology in 48 hrs if not improved. ED sooner if symptoms worsen or change.  ED immediately with high or refractory fever, visual changes, loss

## 2023-11-12 LAB
CULTURE: ABNORMAL
SPECIMEN DESCRIPTION: ABNORMAL

## 2023-11-20 LAB
SEND OUT REPORT: NORMAL
TEST NAME: NORMAL

## 2024-02-27 ENCOUNTER — HOSPITAL ENCOUNTER (OUTPATIENT)
Dept: CT IMAGING | Age: 64
Discharge: HOME | End: 2024-02-29
Payer: COMMERCIAL

## 2024-02-27 DIAGNOSIS — M06.4 UNDIFFERENTIATED INFLAMMATORY ARTHRITIS (HCC): ICD-10-CM

## 2024-02-27 DIAGNOSIS — M35.01 SJOGREN'S SYNDROME WITH KERATOCONJUNCTIVITIS SICCA (HCC): ICD-10-CM

## 2024-02-27 DIAGNOSIS — R93.89 ABNORMAL CT OF THE CHEST: ICD-10-CM

## 2024-02-27 PROCEDURE — 71250 CT THORAX DX C-: CPT

## 2024-02-29 DIAGNOSIS — R91.1 LUNG NODULE: Primary | ICD-10-CM

## 2024-04-02 ENCOUNTER — HOSPITAL ENCOUNTER (OUTPATIENT)
Age: 64
Discharge: HOME | End: 2024-04-02
Payer: COMMERCIAL

## 2024-04-02 ENCOUNTER — OFFICE VISIT (OUTPATIENT)
Dept: RHEUMATOLOGY | Age: 64
End: 2024-04-02
Payer: COMMERCIAL

## 2024-04-02 VITALS — BODY MASS INDEX: 22.82 KG/M2 | HEIGHT: 66 IN | WEIGHT: 142 LBS

## 2024-04-02 DIAGNOSIS — M06.4 UNDIFFERENTIATED INFLAMMATORY ARTHRITIS (HCC): Primary | ICD-10-CM

## 2024-04-02 DIAGNOSIS — M35.01 SJOGREN'S SYNDROME WITH KERATOCONJUNCTIVITIS SICCA (HCC): ICD-10-CM

## 2024-04-02 DIAGNOSIS — M15.9 GENERALIZED OSTEOARTHRITIS: ICD-10-CM

## 2024-04-02 DIAGNOSIS — M06.4 UNDIFFERENTIATED INFLAMMATORY ARTHRITIS (HCC): ICD-10-CM

## 2024-04-02 LAB
ALBUMIN SERPL-MCNC: 5.1 G/DL (ref 3.5–5.2)
ALP SERPL-CCNC: 79 U/L (ref 35–104)
ALT SERPL-CCNC: 16 U/L (ref 0–32)
ANION GAP SERPL CALCULATED.3IONS-SCNC: 8 MMOL/L (ref 7–16)
AST SERPL-CCNC: 21 U/L (ref 0–31)
BACTERIA URNS QL MICRO: ABNORMAL
BASOPHILS # BLD: 0.03 K/UL (ref 0–0.2)
BASOPHILS NFR BLD: 1 % (ref 0–2)
BILIRUB SERPL-MCNC: 0.3 MG/DL (ref 0–1.2)
BILIRUB UR QL STRIP: NEGATIVE
BUN SERPL-MCNC: 8 MG/DL (ref 6–23)
CALCIUM SERPL-MCNC: 9.9 MG/DL (ref 8.6–10.2)
CHLORIDE SERPL-SCNC: 104 MMOL/L (ref 98–107)
CLARITY UR: CLEAR
CO2 SERPL-SCNC: 27 MMOL/L (ref 22–29)
COLOR UR: YELLOW
CREAT SERPL-MCNC: 0.8 MG/DL (ref 0.5–1)
CRP SERPL HS-MCNC: <3 MG/L (ref 0–5)
EOSINOPHIL # BLD: 0.14 K/UL (ref 0.05–0.5)
EOSINOPHILS RELATIVE PERCENT: 3 % (ref 0–6)
ERYTHROCYTE [DISTWIDTH] IN BLOOD BY AUTOMATED COUNT: 12.2 % (ref 11.5–15)
ERYTHROCYTE [SEDIMENTATION RATE] IN BLOOD BY WESTERGREN METHOD: 8 MM/HR (ref 0–20)
GFR SERPL CREATININE-BSD FRML MDRD: 80 ML/MIN/1.73M2
GLUCOSE SERPL-MCNC: 95 MG/DL (ref 74–99)
GLUCOSE UR STRIP-MCNC: NEGATIVE MG/DL
HCT VFR BLD AUTO: 42.7 % (ref 34–48)
HGB BLD-MCNC: 13.6 G/DL (ref 11.5–15.5)
HGB UR QL STRIP.AUTO: NEGATIVE
IMM GRANULOCYTES # BLD AUTO: <0.03 K/UL (ref 0–0.58)
IMM GRANULOCYTES NFR BLD: 0 % (ref 0–5)
KETONES UR STRIP-MCNC: NEGATIVE MG/DL
LEUKOCYTE ESTERASE UR QL STRIP: ABNORMAL
LYMPHOCYTES NFR BLD: 1.66 K/UL (ref 1.5–4)
LYMPHOCYTES RELATIVE PERCENT: 31 % (ref 20–42)
MCH RBC QN AUTO: 30.2 PG (ref 26–35)
MCHC RBC AUTO-ENTMCNC: 31.9 G/DL (ref 32–34.5)
MCV RBC AUTO: 94.9 FL (ref 80–99.9)
MONOCYTES NFR BLD: 0.39 K/UL (ref 0.1–0.95)
MONOCYTES NFR BLD: 7 % (ref 2–12)
NEUTROPHILS NFR BLD: 58 % (ref 43–80)
NEUTS SEG NFR BLD: 3.08 K/UL (ref 1.8–7.3)
NITRITE UR QL STRIP: NEGATIVE
PH UR STRIP: 7 [PH] (ref 5–9)
PLATELET # BLD AUTO: 290 K/UL (ref 130–450)
PMV BLD AUTO: 10.3 FL (ref 7–12)
POTASSIUM SERPL-SCNC: 3.9 MMOL/L (ref 3.5–5)
PROT SERPL-MCNC: 8.1 G/DL (ref 6.4–8.3)
PROT UR STRIP-MCNC: NEGATIVE MG/DL
RBC # BLD AUTO: 4.5 M/UL (ref 3.5–5.5)
RBC #/AREA URNS HPF: ABNORMAL /HPF
RHEUMATOID FACT SER NEPH-ACNC: 11 IU/ML (ref 0–13)
SODIUM SERPL-SCNC: 139 MMOL/L (ref 132–146)
SP GR UR STRIP: <1.005 (ref 1–1.03)
UROBILINOGEN UR STRIP-ACNC: 0.2 EU/DL (ref 0–1)
WBC #/AREA URNS HPF: ABNORMAL /HPF
WBC OTHER # BLD: 5.3 K/UL (ref 4.5–11.5)

## 2024-04-02 PROCEDURE — 84155 ASSAY OF PROTEIN SERUM: CPT

## 2024-04-02 PROCEDURE — 36415 COLL VENOUS BLD VENIPUNCTURE: CPT

## 2024-04-02 PROCEDURE — 86431 RHEUMATOID FACTOR QUANT: CPT

## 2024-04-02 PROCEDURE — 86235 NUCLEAR ANTIGEN ANTIBODY: CPT

## 2024-04-02 PROCEDURE — 85025 COMPLETE CBC W/AUTO DIFF WBC: CPT

## 2024-04-02 PROCEDURE — 99214 OFFICE O/P EST MOD 30 MIN: CPT | Performed by: INTERNAL MEDICINE

## 2024-04-02 PROCEDURE — 81001 URINALYSIS AUTO W/SCOPE: CPT

## 2024-04-02 PROCEDURE — 84165 PROTEIN E-PHORESIS SERUM: CPT

## 2024-04-02 PROCEDURE — 83516 IMMUNOASSAY NONANTIBODY: CPT

## 2024-04-02 PROCEDURE — 85652 RBC SED RATE AUTOMATED: CPT

## 2024-04-02 PROCEDURE — 80053 COMPREHEN METABOLIC PANEL: CPT

## 2024-04-02 PROCEDURE — 86140 C-REACTIVE PROTEIN: CPT

## 2024-04-02 PROCEDURE — G2211 COMPLEX E/M VISIT ADD ON: HCPCS | Performed by: INTERNAL MEDICINE

## 2024-04-02 ASSESSMENT — ENCOUNTER SYMPTOMS
DIARRHEA: 0
COLOR CHANGE: 0
ABDOMINAL PAIN: 0
VOMITING: 0
NAUSEA: 0

## 2024-04-02 NOTE — PROGRESS NOTES
Vanessa Perez 1960 is a 64 y.o. female, here for evaluation of the following chief complaint(s):  Follow-up (Patient is here today to follow up on Inflammatory Arthritis)         ASSESSMENT/PLAN:    Vanessa Perez 1960 is a 64 y.o. female seen in consult for inflammatory arthritis.    1.  Undifferentiated inflammatory arthritis-she does appear to have some component of inflammatory arthritis, though exam is not overly impressive.  Hands are somewhat diffusely puffy.  She has failed all the traditional DMARDs including Plaquenil, methotrexate, Arava, sulfasalazine.  At this point we did discuss that the next step would be Biologics, but will hold off for right now given the lack of erosive disease and unimpressive exam.  Continue Celebrex as needed.    2.  Sjogren's syndrome-manifest as positive SHAVON, positive SSA, SSA 52 kDa, sicca symptoms, inflammatory arthritis.  She has failed Plaquenil.  I would hold off on being more aggressive for now.  She has sicca symptoms.  No other extra-glandular manifestations.    3.  Positive RNA polymerase 3 antibody-SCL 70 and PM SCL antibodies are negative.  Hands are little diffusely puffy but she does not have sclerodactyly or Raynaud's.  However her mother  of scleroderma related ILD.  Without sclerodactyly or Raynaud's I certainly would not diagnose her with scleroderma though scleroderma sine a sclerodactyly would be in the differential.  She had an echocardiogram which did not show any evidence of pulmonary hypertension.  Biomarker panel is negative other than SSA 52 kDa.  CT scan did not show any evidence of ILD but did show a changing lung nodule.  She has been referred to pulmonology and has an appointment on .    4.  Osteoarthritis-she has some underlying osteoarthritis regardless.  Continue Celebrex 200 mg twice daily as needed with food.    1. Undifferentiated inflammatory arthritis (HCC)  -     CBC with Auto Differential; Future  -

## 2024-04-03 LAB
ALBUMIN SERPL-MCNC: 4.5 G/DL (ref 3.5–4.7)
ALPHA1 GLOB SERPL ELPH-MCNC: 0.2 G/DL (ref 0.2–0.4)
ALPHA2 GLOB SERPL ELPH-MCNC: 0.9 G/DL (ref 0.5–1)
B-GLOBULIN SERPL ELPH-MCNC: 1 G/DL (ref 0.8–1.3)
GAMMA GLOB SERPL ELPH-MCNC: 1.2 G/DL (ref 0.7–1.6)
PATHOLOGIST: NORMAL
PROT PATTERN SERPL ELPH-IMP: NORMAL
PROT SERPL-MCNC: 7.8 G/DL (ref 6.4–8.3)
SCLERODERMA (SCL-70) AB: NEGATIVE

## 2024-04-06 LAB
SEND OUT REPORT: NORMAL
TEST NAME: NORMAL

## 2024-04-07 LAB — RNAP III IGG SERPL IA-ACNC: 30 UNITS (ref 0–19)

## 2024-04-16 ENCOUNTER — OFFICE VISIT (OUTPATIENT)
Dept: PULMONOLOGY | Age: 64
End: 2024-04-16
Payer: COMMERCIAL

## 2024-04-16 VITALS
TEMPERATURE: 97.1 F | HEIGHT: 65 IN | SYSTOLIC BLOOD PRESSURE: 119 MMHG | OXYGEN SATURATION: 100 % | BODY MASS INDEX: 24.32 KG/M2 | HEART RATE: 70 BPM | WEIGHT: 146 LBS | DIASTOLIC BLOOD PRESSURE: 70 MMHG | RESPIRATION RATE: 16 BRPM

## 2024-04-16 DIAGNOSIS — R91.1 LUNG NODULE: Primary | ICD-10-CM

## 2024-04-16 LAB
EXPIRATORY TIME: 7.06 SEC
FEF 25-75% %PRED-PRE: 140 L/SEC
FEF 25-75% PRED: 2.14 L/SEC
FEF 25-75-PRE: 3.01 L/SEC
FEV1 %PRED-PRE: 116 %
FEV1 PRED: 2.46 L
FEV1/FVC %PRED-PRE: 102 %
FEV1/FVC PRED: 79 %
FEV1/FVC: 81 %
FEV1: 2.87 L
FVC %PRED-PRE: 112 %
FVC PRED: 3.15 L
FVC: 3.54 L
PEF %PRED-PRE: NORMAL
PEF PRED: NORMAL
PEF-PRE: NORMAL

## 2024-04-16 PROCEDURE — 99203 OFFICE O/P NEW LOW 30 MIN: CPT | Performed by: INTERNAL MEDICINE

## 2024-04-16 PROCEDURE — 94010 BREATHING CAPACITY TEST: CPT | Performed by: INTERNAL MEDICINE

## 2024-04-16 PROCEDURE — 99205 OFFICE O/P NEW HI 60 MIN: CPT | Performed by: INTERNAL MEDICINE

## 2024-04-16 ASSESSMENT — PULMONARY FUNCTION TESTS
FEV1_PERCENT_PREDICTED_PRE: 116
FEV1/FVC_PERCENT_PREDICTED_PRE: 102
FEV1/FVC: 81
FVC_PREDICTED: 3.15
FEV1_PREDICTED: 2.46
FVC_PERCENT_PREDICTED_PRE: 112
FVC: 3.54
FEV1/FVC_PREDICTED: 79
FEV1: 2.87

## 2024-04-16 NOTE — PROGRESS NOTES
Pulmonary Critical Care Medicine      NEW PATIENT VISIT-PULMONARY    4/16/2024     REFERRING PHYSICIAN:  Swapnil Bledsoe MD     REASON FOR REFERRAL:  Lung Nodule     History of Present Illness    Vanessa Perez is a 64 y.o. never smoker  female who has been referred here for left upper lobe groundglass opacity.  Her mother had scleroderma, CAD visited rheumatologist because of joint pains especially the small joints of her hands.  She also had some stiffness.  She tested positive for SHAVON and some other autoimmune markers but tested negative for scleroderma.   The groundglass opacity is located in the left apex measuring about 12 to 14 mm.    No hemoptysis   No chest pain   No constitutional sx.     Factors contributing to malignant potential-  Size and location of the nodule-left upper lobe, 12 mm  Characteristics of the nodule-groundglass opacity  Mediastinal involvement-none  Calcification-none  Smoking history-never smoker  Family history of cancer-no known cancers  Personal history of cancer-none  weight loss-none  Other constitutional symptoms-   none    Exercise tolerance/MMRC score( Bold )     Grade Description of Breathlessness   0 I only getbreathless with strenuous exercise.   1 I get short of breath when hurrying on level ground or walking up a slight hill.   2 On level ground, I walkslower than people of the same age because of breathlessness, or have to stop for breath when walking at my own pace.   3 I stop for breath after walking about 100 yards or after a fewminutes on level ground.   4 I am too breathless to leave the house or I am breathless when dressing.       Mallampati score- 1    Smoking history-never smoker  Occupational exposure to carcinogens -  No history of exposure to any occupational Pneumotoxins with carcinogenic potential      Pets- no exposure to any exotic birds or pets .     Past Intubation- Never for any respiratory illness       PastMedical History   Past Medical

## 2024-04-16 NOTE — PROGRESS NOTES
New pt to see provider for abnormal Chest CT; images reviewed with pt and her  and discussed. Plan for Chest CT in 6 mos and follow up in office after. Appt letter for CT to be mailed. Pt given 6 mos follow up appt.

## 2024-08-14 ENCOUNTER — TELEPHONE (OUTPATIENT)
Dept: CARDIOLOGY | Age: 64
End: 2024-08-14

## 2024-08-14 NOTE — TELEPHONE ENCOUNTER
Patient returned our call about scheduling an echo. Patient states that she does not need to due test. Her stress test came out good.    Electronically signed by Krystina Choi on 8/14/2024 at 11:16 AM

## 2024-08-14 NOTE — TELEPHONE ENCOUNTER
CALLED PATIENT AND LEFT MESSAGE TO SCHEDULE ECHO.    Electronically signed by Krystina Choi on 8/14/2024 at 11:12 AM

## 2024-09-30 ENCOUNTER — HOSPITAL ENCOUNTER (OUTPATIENT)
Dept: CT IMAGING | Age: 64
Discharge: HOME | End: 2024-10-02
Attending: INTERNAL MEDICINE
Payer: COMMERCIAL

## 2024-09-30 DIAGNOSIS — R91.1 LUNG NODULE: ICD-10-CM

## 2024-09-30 PROCEDURE — 71250 CT THORAX DX C-: CPT

## 2024-10-16 ENCOUNTER — SCHEDULED TELEPHONE ENCOUNTER (OUTPATIENT)
Dept: PULMONOLOGY | Age: 64
End: 2024-10-16

## 2024-10-16 DIAGNOSIS — R91.1 LUNG NODULE: Primary | ICD-10-CM

## 2024-10-16 NOTE — PROGRESS NOTES
Documentation:  I communicated with the patient and/or health care decision maker about CT results.   Details of this discussion including any medical advice provided: Results discussed and plan for follow up CT in 6 mos with office visit after.     Total Time: minutes: 11-20 minutes    Vanessa Perez was evaluated through a synchronous (real-time) audio encounter. Patient identification was verified at the start of the visit. She (or guardian if applicable) is aware that this is a billable service, which includes applicable co-pays. This visit was conducted with the patient's (and/or legal guardian's) verbal consent. She has not had a related appointment within my department in the past 7 days or scheduled within the next 24 hours.   The patient was located at Home: [address] [place] [address].  The provider was located at Facility (Appt Dept): [address] [place] [address].  Confirm you are appropriately licensed, registered, or certified to deliver care in the state where the patient is located as indicated above. If you are not or unsure, please re-schedule the visit: Yes, I confirm.     Note: not billable if this call serves to triage the patient into an appointment for the relevant concern    Vanessa Perez is a 64 y.o. female evaluated via telephone on 10/16/2024 for No chief complaint on file.  .        Elly Mckeon RN      
mm  Characteristics of the nodule-groundglass opacity  Mediastinal involvement-none  Calcification-none  Smoking history-never smoker  Family history of cancer-no known cancers  Personal history of cancer-none  weight loss-none  Other constitutional symptoms-   none    Exercise tolerance/MMRC score( Bold )     Grade Description of Breathlessness   0 I only getbreathless with strenuous exercise.   1 I get short of breath when hurrying on level ground or walking up a slight hill.   2 On level ground, I walkslower than people of the same age because of breathlessness, or have to stop for breath when walking at my own pace.   3 I stop for breath after walking about 100 yards or after a fewminutes on level ground.   4 I am too breathless to leave the house or I am breathless when dressing.       Mallampati score- 1    Smoking history-never smoker  Occupational exposure to carcinogens -  No history of exposure to any occupational Pneumotoxins with carcinogenic potential      Pets- no exposure to any exotic birds or pets .     Past Intubation- Never for any respiratory illness       PastMedical History   Past Medical History:   Diagnosis Date    Cervical cancer (HCC) 2018    [facility]    Inflammatory arthritis 2008    [facility]- Rheumatology       Past Surgical History  Past Surgical History:   Procedure Laterality Date    BREAST CYST EXCISION Left 2009    CHOLECYSTECTOMY, LAPAROSCOPIC N/A 2013    [place]    SALIVARY GLAND SURGERY  2006    [place]       Allergies  Allergies   Allergen Reactions    Mixed Feathers Other (See Comments)       Medications  Current Outpatient Medications   Medication Sig Dispense Refill    celecoxib (CELEBREX) 200 MG capsule Take 1 capsule by mouth 2 times daily as needed for Pain 60 capsule 3     No current facility-administered medications for this visit.       Social History  Social History     Tobacco Use    Smoking status: Never    Smokeless tobacco: Never   Substance Use Topics    Alcohol

## 2024-11-04 ENCOUNTER — OFFICE VISIT (OUTPATIENT)
Dept: FAMILY MEDICINE CLINIC | Age: 64
End: 2024-11-04

## 2024-11-04 VITALS
OXYGEN SATURATION: 95 % | BODY MASS INDEX: 23.46 KG/M2 | SYSTOLIC BLOOD PRESSURE: 110 MMHG | DIASTOLIC BLOOD PRESSURE: 74 MMHG | RESPIRATION RATE: 15 BRPM | HEART RATE: 89 BPM | TEMPERATURE: 96.9 F | WEIGHT: 146 LBS | HEIGHT: 66 IN

## 2024-11-04 DIAGNOSIS — R09.81 SINUS CONGESTION: Primary | ICD-10-CM

## 2024-11-04 DIAGNOSIS — R51.9 NONINTRACTABLE HEADACHE, UNSPECIFIED CHRONICITY PATTERN, UNSPECIFIED HEADACHE TYPE: ICD-10-CM

## 2024-11-04 LAB
Lab: NORMAL
PERFORMING INSTRUMENT: NORMAL
QC PASS/FAIL: NORMAL
SARS-COV-2, POC: NORMAL

## 2024-11-04 NOTE — PROGRESS NOTES
tablet by mouth 2 times daily for 7 days    Nonintractable headache, unspecified chronicity pattern, unspecified headache type  -     POCT COVID-19, Antigen  -     amoxicillin-clavulanate (AUGMENTIN) 875-125 MG per tablet; Take 1 tablet by mouth 2 times daily for 7 days         BRISA MADRIGAL MD

## 2024-11-08 ENCOUNTER — OFFICE VISIT (OUTPATIENT)
Dept: FAMILY MEDICINE CLINIC | Age: 64
End: 2024-11-08

## 2024-11-08 VITALS
DIASTOLIC BLOOD PRESSURE: 70 MMHG | TEMPERATURE: 97.8 F | HEART RATE: 60 BPM | SYSTOLIC BLOOD PRESSURE: 132 MMHG | HEIGHT: 66 IN | OXYGEN SATURATION: 100 % | BODY MASS INDEX: 23.46 KG/M2 | RESPIRATION RATE: 16 BRPM | WEIGHT: 146 LBS

## 2024-11-08 DIAGNOSIS — J01.90 ACUTE SINUSITIS, RECURRENCE NOT SPECIFIED, UNSPECIFIED LOCATION: Primary | ICD-10-CM

## 2024-11-08 RX ORDER — PREDNISONE 10 MG/1
TABLET ORAL
Qty: 12 TABLET | Refills: 0 | Status: SHIPPED | OUTPATIENT
Start: 2024-11-08 | End: 2024-11-13

## 2024-11-08 RX ORDER — GUAIFENESIN 600 MG/1
600 TABLET, EXTENDED RELEASE ORAL 2 TIMES DAILY
Qty: 30 TABLET | Refills: 0 | Status: SHIPPED | OUTPATIENT
Start: 2024-11-08 | End: 2024-11-23

## 2024-11-08 ASSESSMENT — ENCOUNTER SYMPTOMS
EYES NEGATIVE: 1
DIARRHEA: 0
CHEST TIGHTNESS: 0
NAUSEA: 0
WHEEZING: 0
SINUS PRESSURE: 1
VOMITING: 0
COUGH: 0
SORE THROAT: 0
ABDOMINAL PAIN: 0
SHORTNESS OF BREATH: 0

## 2024-11-08 NOTE — PROGRESS NOTES
[facility]     24  Vanessa Perez : 1960 Sex: female  Age: 64 y.o.    Chief Complaint   Patient presents with    nasal drainage     Started augmentin on Monday now having drainage.        HPI  Patient presents to [facility] care today complaining of sinus drainage loss of voice, head congestion  , Headache over the last several days.  She was seen in express care 4 days ago and given Augmentin for sinus congestion and headache states she is now having significant drainage and not feeling better.  A lot of facial pressure.  Denies fever or chills.        Review of Systems   Constitutional:  Negative for chills and fever.   HENT:  Positive for congestion, postnasal drip and sinus pressure. Negative for ear pain and sore throat.    Eyes: Negative.    Respiratory:  Negative for cough, chest tightness, shortness of breath and wheezing.    Cardiovascular:  Negative for chest pain.   Gastrointestinal:  Negative for abdominal pain, diarrhea, nausea and vomiting.   Musculoskeletal:  Negative for myalgias.   Neurological:  Positive for headaches.                 REST OF PERTINENT ROS GONE OVER AND WAS NEGATIVE.                   Current Outpatient Medications:     predniSONE (DELTASONE) 10 MG tablet, Take 3 tablets by mouth daily for 2 days, THEN 2 tablets daily for 2 days, THEN 1 tablet daily for 2 days., Disp: 12 tablet, Rfl: 0    guaiFENesin (MUCINEX) 600 MG extended release tablet, Take 1 tablet by mouth 2 times daily for 15 days, Disp: 30 tablet, Rfl: 0    amoxicillin-clavulanate (AUGMENTIN) 875-125 MG per tablet, Take 1 tablet by mouth 2 times daily for 7 days, Disp: 14 tablet, Rfl: 0    celecoxib (CELEBREX) 200 MG capsule, Take 1 capsule by mouth 2 times daily as needed for Pain, Disp: 60 capsule, Rfl: 3  Allergies   Allergen Reactions    Mixed Feathers Other (See Comments)       Past Medical History:   Diagnosis Date    Cervical cancer (HCC) 2018    [facility]    Inflammatory arthritis

## 2024-12-04 ENCOUNTER — PATIENT MESSAGE (OUTPATIENT)
Dept: FAMILY MEDICINE CLINIC | Age: 64
End: 2024-12-04

## 2025-02-14 ENCOUNTER — TELEPHONE (OUTPATIENT)
Dept: PULMONOLOGY | Age: 65
End: 2025-02-14

## 2025-02-14 NOTE — TELEPHONE ENCOUNTER
Mailed letter to patient to inform her of the CT of the Chest that is scheduled for her at [facility] [place] . This test is scheduled on  Tuesday, April1, 2025 at  3:00 pm. Please arrive 30 minutes prior to appointment time. No Test Prep is needed

## 2025-02-19 ENCOUNTER — OFFICE VISIT (OUTPATIENT)
Dept: FAMILY MEDICINE CLINIC | Age: 65
End: 2025-02-19

## 2025-02-19 VITALS
RESPIRATION RATE: 17 BRPM | WEIGHT: 147 LBS | OXYGEN SATURATION: 98 % | DIASTOLIC BLOOD PRESSURE: 72 MMHG | TEMPERATURE: 97.1 F | HEIGHT: 66 IN | SYSTOLIC BLOOD PRESSURE: 118 MMHG | HEART RATE: 76 BPM | BODY MASS INDEX: 23.63 KG/M2

## 2025-02-19 DIAGNOSIS — J40 BRONCHITIS: ICD-10-CM

## 2025-02-19 DIAGNOSIS — M06.4 UNDIFFERENTIATED INFLAMMATORY ARTHRITIS (HCC): ICD-10-CM

## 2025-02-19 DIAGNOSIS — R09.89 CHEST CONGESTION: Primary | ICD-10-CM

## 2025-02-19 LAB
INFLUENZA A ANTIBODY: NEGATIVE
INFLUENZA B ANTIBODY: NEGATIVE
S PYO AG THROAT QL: NORMAL

## 2025-02-19 RX ORDER — DOXYCYCLINE HYCLATE 100 MG
100 TABLET ORAL 2 TIMES DAILY
Qty: 20 TABLET | Refills: 0 | Status: SHIPPED | OUTPATIENT
Start: 2025-02-19 | End: 2025-03-01

## 2025-02-19 RX ORDER — METHYLPREDNISOLONE ACETATE 40 MG/ML
40 INJECTION, SUSPENSION INTRA-ARTICULAR; INTRALESIONAL; INTRAMUSCULAR; SOFT TISSUE ONCE
Status: COMPLETED | OUTPATIENT
Start: 2025-02-19 | End: 2025-02-19

## 2025-02-19 RX ADMIN — METHYLPREDNISOLONE ACETATE 40 MG: 40 INJECTION, SUSPENSION INTRA-ARTICULAR; INTRALESIONAL; INTRAMUSCULAR; SOFT TISSUE at 11:02

## 2025-02-19 NOTE — PROGRESS NOTES
OFFICE NOTE    25  Name: Vanessa Perez  :1960   Sex:female   Age:64 y.o.      SUBJECTIVE  Chief Complaint   Patient presents with    Cough     X 5 days     Congestion     X 5 days     Sore Throat     X 5 days     Chills     X 5 days        HPI reports flu like symptoms for few days    Review of Systems   Has malaise and myalgias. Cough is paroxysmal. Keeping her awake at night      Current Outpatient Medications:     doxycycline hyclate (VIBRA-TABS) 100 MG tablet, Take 1 tablet by mouth 2 times daily for 10 days, Disp: 20 tablet, Rfl: 0    celecoxib (CELEBREX) 200 MG capsule, Take 1 capsule by mouth 2 times daily as needed for Pain, Disp: 60 capsule, Rfl: 3  Allergies   Allergen Reactions    Mixed Feathers Other (See Comments)       Past Medical History:   Diagnosis Date    Cervical cancer (HCC) 2018    [facility]    Inflammatory arthritis 2008    [facility]- Rheumatology     Past Surgical History:   Procedure Laterality Date    BREAST CYST EXCISION Left 2009    CHOLECYSTECTOMY, LAPAROSCOPIC N/A     [place]    SALIVARY GLAND SURGERY  2006    [facility]     Family History   Problem Relation Age of Onset    Other Mother         scleroderma    Other Father         valvular heart disease-endocarditis    Coronary Art Dis Brother         MI age 42- valve replacement-ICD     Social History       Tobacco History       Smoking Status  Never      Smokeless Tobacco Use  Never              Alcohol History       Alcohol Use Status  Yes Drinks/Week  1 Glasses of wine per week Amount  1.0 standard drink of alcohol/wk Comment  social              Drug Use       Drug Use Status  Never              Sexual Activity       Sexually Active  Not Asked                    OBJECTIVE  Vitals:    25 1029   BP: 118/72   Site: Right Upper Arm   Position: Sitting   Cuff Size: Medium Adult   Pulse: 76   Resp: 17   Temp: 97.1 °F (36.2 °C)   TempSrc: Temporal   SpO2: 98%   Weight: 66.7 kg (147 lb)   Height: 1.676 m (5' 6\")

## 2025-05-02 ENCOUNTER — HOSPITAL ENCOUNTER (OUTPATIENT)
Dept: CT IMAGING | Age: 65
Discharge: HOME | End: 2025-05-02
Attending: INTERNAL MEDICINE
Payer: COMMERCIAL

## 2025-05-02 DIAGNOSIS — R91.1 LUNG NODULE: ICD-10-CM

## 2025-05-02 PROCEDURE — 71250 CT THORAX DX C-: CPT

## 2025-05-15 ENCOUNTER — OFFICE VISIT (OUTPATIENT)
Age: 65
End: 2025-05-15
Payer: COMMERCIAL

## 2025-05-15 ENCOUNTER — TELEPHONE (OUTPATIENT)
Age: 65
End: 2025-05-15

## 2025-05-15 VITALS
SYSTOLIC BLOOD PRESSURE: 125 MMHG | RESPIRATION RATE: 16 BRPM | TEMPERATURE: 98.5 F | DIASTOLIC BLOOD PRESSURE: 80 MMHG | HEART RATE: 78 BPM | OXYGEN SATURATION: 99 %

## 2025-05-15 DIAGNOSIS — R91.1 LUNG NODULE: Primary | ICD-10-CM

## 2025-05-15 PROCEDURE — 99215 OFFICE O/P EST HI 40 MIN: CPT | Performed by: INTERNAL MEDICINE

## 2025-05-15 PROCEDURE — 1123F ACP DISCUSS/DSCN MKR DOCD: CPT | Performed by: INTERNAL MEDICINE

## 2025-05-15 RX ORDER — AZITHROMYCIN 500 MG/1
500 TABLET, FILM COATED ORAL DAILY
Qty: 5 TABLET | Refills: 0 | Status: SHIPPED | OUTPATIENT
Start: 2025-05-15 | End: 2025-05-20

## 2025-05-15 NOTE — PROGRESS NOTES
Pulmonary Critical Care Medicine      FOLLOW UP  VISIT -PULMONARY    5/15/2025     Ms. Vanessa Perez is being followed here for   ASTRID GGO/scar like nodule measuring up to 1.3 cms.       Since the last visit she underwent follow-up CT scan of the chest.  CAT scan was personally reviewed by me the left upper lobe groundglass nodule measures up to 10 to 13 mm and has been stable for the last 12 months.    She has been having some cough and congestion for the last week or so . Also had fever last night that broke on its own .       Background -     Vanessa Perez is a 65 y.o. never smoker  female who was initially  referred here for left upper lobe groundglass opacity.  Her mother had scleroderma, CAD visited rheumatologist because of joint pains especially the small joints of her hands.  She also had some stiffness.  She tested positive for SHAVON and some other autoimmune markers but tested negative for scleroderma.   The groundglass opacity is located in the left apex measuring about 12 to 14 mm.    No hemoptysis   No chest pain   No constitutional sx.     Factors contributing to malignant potential-  Size and location of the nodule-left upper lobe, 12 mm  Characteristics of the nodule-groundglass opacity  Mediastinal involvement-none  Calcification-none  Smoking history-never smoker  Family history of cancer-no known cancers  Personal history of cancer-none  weight loss-none  Other constitutional symptoms-   none    Exercise tolerance/MMRC score( Bold )     Grade Description of Breathlessness   0 I only getbreathless with strenuous exercise.   1 I get short of breath when hurrying on level ground or walking up a slight hill.   2 On level ground, I walkslower than people of the same age because of breathlessness, or have to stop for breath when walking at my own pace.   3 I stop for breath after walking about 100 yards or after a fewminutes on level ground.   4 I am too breathless to leave the house or I

## 2025-05-23 ENCOUNTER — HOSPITAL ENCOUNTER (OUTPATIENT)
Dept: PET IMAGING | Age: 65
Discharge: HOME | End: 2025-05-25
Attending: INTERNAL MEDICINE
Payer: COMMERCIAL

## 2025-05-23 DIAGNOSIS — R91.1 LUNG NODULE: ICD-10-CM

## 2025-05-23 LAB — GLUCOSE BLD-MCNC: 86 MG/DL (ref 74–99)

## 2025-05-23 PROCEDURE — 3430000000 HC RX DIAGNOSTIC RADIOPHARMACEUTICAL: Performed by: RADIOLOGY

## 2025-05-23 PROCEDURE — A9609 HC RX DIAGNOSTIC RADIOPHARMACEUTICAL: HCPCS | Performed by: RADIOLOGY

## 2025-05-23 PROCEDURE — 82962 GLUCOSE BLOOD TEST: CPT

## 2025-05-23 PROCEDURE — 78815 PET IMAGE W/CT SKULL-THIGH: CPT

## 2025-05-23 RX ORDER — FLUDEOXYGLUCOSE F 18 200 MCI/ML
15 INJECTION, SOLUTION INTRAVENOUS
Status: COMPLETED | OUTPATIENT
Start: 2025-05-23 | End: 2025-05-23

## 2025-05-23 RX ADMIN — FLUDEOXYGLUCOSE F 18 15 MILLICURIE: 200 INJECTION, SOLUTION INTRAVENOUS at 08:14

## 2025-06-05 ENCOUNTER — TELEPHONE (OUTPATIENT)
Age: 65
End: 2025-06-05

## 2025-06-05 NOTE — TELEPHONE ENCOUNTER
Call to pt after PET scan results are reviewed and arranged with pt a telephone call to discuss results and follow up with Dr Cain. Pt agreeable to a 215pm phone call tomorrow.

## 2025-06-06 ENCOUNTER — SCHEDULED TELEPHONE ENCOUNTER (OUTPATIENT)
Age: 65
End: 2025-06-06

## 2025-06-06 DIAGNOSIS — R91.1 LUNG NODULE: Primary | ICD-10-CM

## 2025-06-06 NOTE — PROGRESS NOTES
Pulmonary Critical Care Medicine      FOLLOW UP  VISIT -PULMONARY  6/6/2025   Patient was seen today via Telehealth by agreement and consent. I used the following Telehealth technology: Audio capability only. This patient encounter is appropriate and reasonable under the circumstances given the patient's particular presentation at this time. The patient has been advised of the potential risks and limitations of this mode of treatment (including but not limited to the absence of in-person examination) and has agreed to be treated in a remote fashion in spite of them. Any and all of the patient's/patient's family's questions on this issue have been answered and I have made no promises or guarantees to the patient. The patient has also been advised to contact this office for worsening conditions or problems, and seek emergency medical treatment and/or call 911 if the patient deems either necessary.     Patient identification was verified at the start of the visit: Yes     Total time spent on this encounter: 22 minutes     Since last visit she underwent a PET scan, we were following 1.3 cm left upper lobe lung nodule.  PET scan is negative.  This nodule has been stable for about 1-1/2 years.  Will schedule another CT scan and complete 2 years follow-up.       Interval history-     Ms. Vanessa Perez is being followed here for   ASTRID GGO/scar like nodule measuring up to 1.3 cms.     Since the last visit she underwent follow-up CT scan of the chest.  CAT scan was personally reviewed by me the left upper lobe groundglass nodule measures up to 10 to 13 mm and has been stable for the last 12 months.    She has been having some cough and congestion for the last week or so . Also had fever last night that broke on its own .       Background -     Vanessa Perez is a 65 y.o. never smoker  female who was initially  referred here for left upper lobe groundglass opacity.  Her mother had scleroderma, CAD visited